# Patient Record
Sex: MALE | Race: WHITE | NOT HISPANIC OR LATINO | Employment: FULL TIME | URBAN - METROPOLITAN AREA
[De-identification: names, ages, dates, MRNs, and addresses within clinical notes are randomized per-mention and may not be internally consistent; named-entity substitution may affect disease eponyms.]

---

## 2018-01-11 ENCOUNTER — OFFICE VISIT (OUTPATIENT)
Dept: ORTHOPEDICS | Facility: CLINIC | Age: 58
End: 2018-01-11
Payer: COMMERCIAL

## 2018-01-11 VITALS — HEIGHT: 72 IN | WEIGHT: 227 LBS | BODY MASS INDEX: 30.75 KG/M2

## 2018-01-11 DIAGNOSIS — S82.55XA CLOSED NONDISPLACED FRACTURE OF MEDIAL MALLEOLUS OF LEFT TIBIA, INITIAL ENCOUNTER: Primary | ICD-10-CM

## 2018-01-11 PROCEDURE — 27760 CLTX MEDIAL ANKLE FX: CPT | Mod: LT | Performed by: ORTHOPAEDIC SURGERY

## 2018-01-11 ASSESSMENT — PAIN SCALES - GENERAL: PAINLEVEL: MILD PAIN (3)

## 2018-01-11 NOTE — MR AVS SNAPSHOT
"              After Visit Summary   1/11/2018    Jf Díaz    MRN: [id number]           Patient Information     Date Of Birth          1960        Visit Information        Provider Department      1/11/2018 8:30 AM Mannie Nguyễn DO [facility]        Today's Diagnoses     Closed nondisplaced fracture of medial malleolus of left tibia, initial encounter    -  1       Follow-ups after your visit        Your next 10 appointments already scheduled     Jan 17, 2018  8:00 AM CST   Return Visit with Mannie Nguyễn DO   [facility] ([facility]    [address] [place] [address] [id number] [phone]              Who to contact     If you have questions or need follow up information about today's clinic visit or your schedule please contact [facility] directly at [phone].  Normal or non-critical lab and imaging results will be communicated to you by MyChart, letter or phone within 4 business days after the clinic has received the results. If you do not hear from us within 7 days, please contact the clinic through MyChart or phone. If you have a critical or abnormal lab result, we will notify you by phone as soon as possible.  Submit refill requests through WatrHub or call your pharmacy and they will forward the refill request to us. Please allow 3 business days for your refill to be completed.          Additional Information About Your Visit        MyChart Information     WatrHub lets you send messages to your doctor, view your test results, renew your prescriptions, schedule appointments and more. To sign up, go to [email or web address] . Click on \"Log in\" on the left side of the screen, which will take you to the Welcome page. Then click on \"Sign up Now\" on the right side of the page.     You will be asked to enter the access code listed below, as well as some personal information. Please follow the directions to " create your username and password.     Your access code is: [email or web address]  Expires: 2018  8:58 AM     Your access code will  in 90 days. If you need help or a new code, please call your [place] clinic or [phone].        Care EveryWhere ID     This is your Care EveryWhere ID. This could be used by other organizations to access your [place] medical records  [id number]        Your Vitals Were     Height BMI (Body Mass Index)                1.829 m (6') 30.79 kg/m2           Blood Pressure from Last 3 Encounters:   No data found for BP    Weight from Last 3 Encounters:   18 103 kg (227 lb)              Today, you had the following     No orders found for display       Primary Care Provider    Radiology [facility] Provider       No address on file        Equal Access to Services     MERVIN VALLE : Celine Tay, wayogesh luqadaha, qaybta kaalmada reid, ivis martinez . So [facility] [phone].    ATENCIÓN: Si habla español, tiene a brown disposición servicios gratuitos de asistencia lingüística. Llame al [phone].    We comply with applicable federal civil rights laws and [place] laws. We do not discriminate on the basis of race, color, national origin, age, disability, sex, sexual orientation, or gender identity.            Thank you!     Thank you for choosing [facility]  for your care. Our goal is always to provide you with excellent care. Hearing back from our patients is one way we can continue to improve our services. Please take a few minutes to complete the written survey that you may receive in the mail after your visit with us. Thank you!             Your Updated Medication List - Protect others around you: Learn how to safely use, store and throw away your medicines at www.disposemymeds.org.      Notice  As of 2018 12:35 PM    You have not been prescribed any medications.

## 2018-01-11 NOTE — LETTER
1/11/2018         RE: Jf Díaz  [address]  [facility] [place] [address]        Dear Colleague,    Thank you for referring your patient, Jf Díaz, to the [facility]. Please see a copy of my visit note below.    ORTHOPEDIC CONSULT      Chief Complaint: Jf Díaz is a 57 year old male who is being seen for Chief Complaint   Patient presents with     Consult     left ankle pain        History of Present Illness:   On January 7, 2018 had a twisting event injuring his left ankle.  Subsequently evaluated and diagnosed with a medial malleolus fracture.  He is placed in a splint and given crutches.  He has been taking ibuprofen for pain.  No pre-existing injury.    History of a contralateral right tibia fracture requiring surgery.        Patient's past medical, surgical, social and family histories reviewed.     Past medical history: Hernia, appendicitis, right tibia fracture  Past surgeries: Right tibia open reduction internal fixation  Medications:    No current outpatient prescriptions on file prior to visit.  No current facility-administered medications on file prior to visit.     Allergies   Allergen Reactions     Codeine Hives       Social History     Occupational History     Not on file.     Social History Main Topics     Smoking status: Never Smoker     Smokeless tobacco: Not on file     Alcohol use Not on file     Drug use: Not on file     Sexual activity: Not on file       Family history includes lung cancer, bone cancer  REVIEW OF SYSTEMS  10 point review systems performed otherwise negative as noted as per history of present illness.    Physical Exam:  Vitals: Ht 6' (1.829 m)  Wt 227 lb (103 kg)  BMI 30.79 kg/m2  BMI= Body mass index is 30.79 kg/(m^2).  Constitutional: healthy, alert and no acute distress   Psychiatric: mentation appears normal and affect normal/bright  NEURO: no focal deficits  RESP: Normal with easy respirations and no use of accessory muscles to  breathe, no audible wheezing or retractions  CV: LLE:  midcalf and down-  non-pitting edema         Regular rate and rhythm by palpation  SKIN: No erythema, rashes, excoriation, or breakdown. No evidence of infection.   JOINT/EXTREMITIES:left ankle: Motion not tested.  Tenderness along the medial malleolus.  Associated with some swelling and ecchymosis.  Laterally no pain along the lateral malleolus or the event is complex.  There is no Lisfranc's or foot pain.  He has got good capillary refill.  Foot is well perfused.  There is no proximal tib-fib pain.  Some tenderness along the calf area.  (He reports this was associated with injury)     GAIT: not tested     Diagnostic Modalities:  left ankle X-ray: Transverse very minimally displaced medial malleolar fragment.  The mortise is well-maintained.  There is no subluxation.  Independent visualization of the images was performed.      Impression: left very minimally displaced medial malleolar fragment with an intact mortise    Plan:  All of the above pertinent physical exam and imaging modalities findings was reviewed with Jf.    Options reviewed.  Given his minimal displacement I recommended trialing conservative care.  Provided him a fracture boot today.  Recommended nonweightbearing with his crutches.  Edema control measures reviewed.  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  No definitive causes were found at that time he reports.  I recommended going on aspirin 325 twice daily until able to gain some more mobility.  Plan to see back next week for repeat radiograph to make sure no change in alignment.      Return to clinic 1, week(s), or sooner as needed for changes.  Re-x-ray on return: Yes.  Nonweightbearing 3 view left ankle    Antolin Nguyễn D.O.    Again, thank you for allowing me to participate in the care of your patient.        Sincerely,        Mannie Nguyễn, DO

## 2018-01-11 NOTE — NURSING NOTE
Chief Complaint   Patient presents with     Consult     left ankle pain        Initial Ht 1.829 m (6')  Wt 103 kg (227 lb)  BMI 30.79 kg/m2 Estimated body mass index is 30.79 kg/(m^2) as calculated from the following:    Height as of this encounter: 1.829 m (6').    Weight as of this encounter: 103 kg (227 lb).  Medication Reconciliation: complete    BP completed using cuff size: NA (Not Taken)    Anyi Garsia MA

## 2018-01-11 NOTE — PROGRESS NOTES
ORTHOPEDIC CONSULT      Chief Complaint: Jf Díaz is a 57 year old male who is being seen for Chief Complaint   Patient presents with     Consult     left ankle pain        History of Present Illness:   On January 7, 2018 had a twisting event injuring his left ankle.  Subsequently evaluated and diagnosed with a medial malleolus fracture.  He is placed in a splint and given crutches.  He has been taking ibuprofen for pain.  No pre-existing injury.    History of a contralateral right tibia fracture requiring surgery.        Patient's past medical, surgical, social and family histories reviewed.     Past medical history: Hernia, appendicitis, right tibia fracture  Past surgeries: Right tibia open reduction internal fixation  Medications:    No current outpatient prescriptions on file prior to visit.  No current facility-administered medications on file prior to visit.     Allergies   Allergen Reactions     Codeine Hives       Social History     Occupational History     Not on file.     Social History Main Topics     Smoking status: Never Smoker     Smokeless tobacco: Not on file     Alcohol use Not on file     Drug use: Not on file     Sexual activity: Not on file       Family history includes lung cancer, bone cancer  REVIEW OF SYSTEMS  10 point review systems performed otherwise negative as noted as per history of present illness.    Physical Exam:  Vitals: Ht 6' (1.829 m)  Wt 227 lb (103 kg)  BMI 30.79 kg/m2  BMI= Body mass index is 30.79 kg/(m^2).  Constitutional: healthy, alert and no acute distress   Psychiatric: mentation appears normal and affect normal/bright  NEURO: no focal deficits  RESP: Normal with easy respirations and no use of accessory muscles to breathe, no audible wheezing or retractions  CV: LLE:  midcalf and down-  non-pitting edema         Regular rate and rhythm by palpation  SKIN: No erythema, rashes, excoriation, or breakdown. No evidence of infection.   JOINT/EXTREMITIES:left  ankle: Motion not tested.  Tenderness along the medial malleolus.  Associated with some swelling and ecchymosis.  Laterally no pain along the lateral malleolus or the event is complex.  There is no Lisfranc's or foot pain.  He has got good capillary refill.  Foot is well perfused.  There is no proximal tib-fib pain.  Some tenderness along the calf area.  (He reports this was associated with injury)     GAIT: not tested     Diagnostic Modalities:  left ankle X-ray: Transverse very minimally displaced medial malleolar fragment.  The mortise is well-maintained.  There is no subluxation.  Independent visualization of the images was performed.      Impression: left very minimally displaced medial malleolar fragment with an intact mortise    Plan:  All of the above pertinent physical exam and imaging modalities findings was reviewed with Jf.    Options reviewed.  Given his minimal displacement I recommended trialing conservative care.  Provided him a fracture boot today.  Recommended nonweightbearing with his crutches.  Edema control measures reviewed.  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  No definitive causes were found at that time he reports.  I recommended going on aspirin 325 twice daily until able to gain some more mobility.  Plan to see back next week for repeat radiograph to make sure no change in alignment.      Return to clinic 1, week(s), or sooner as needed for changes.  Re-x-ray on return: Yes.  Nonweightbearing 3 view left ankle    Antolin Nguyễn D.O.

## 2018-01-17 ENCOUNTER — RADIANT APPOINTMENT (OUTPATIENT)
Dept: GENERAL RADIOLOGY | Facility: CLINIC | Age: 58
End: 2018-01-17
Attending: ORTHOPAEDIC SURGERY
Payer: COMMERCIAL

## 2018-01-17 ENCOUNTER — OFFICE VISIT (OUTPATIENT)
Dept: ORTHOPEDICS | Facility: CLINIC | Age: 58
End: 2018-01-17
Payer: COMMERCIAL

## 2018-01-17 VITALS — WEIGHT: 227 LBS | BODY MASS INDEX: 30.75 KG/M2 | HEIGHT: 72 IN | TEMPERATURE: 97.1 F

## 2018-01-17 DIAGNOSIS — S82.55XA CLOSED NONDISPLACED FRACTURE OF MEDIAL MALLEOLUS OF LEFT TIBIA, INITIAL ENCOUNTER: ICD-10-CM

## 2018-01-17 DIAGNOSIS — S82.55XD CLOSED NONDISPLACED FRACTURE OF MEDIAL MALLEOLUS OF LEFT TIBIA WITH ROUTINE HEALING, SUBSEQUENT ENCOUNTER: Primary | ICD-10-CM

## 2018-01-17 PROCEDURE — 99207 ZZC FRACTURE CARE IN GLOBAL PERIOD: CPT | Performed by: ORTHOPAEDIC SURGERY

## 2018-01-17 PROCEDURE — 73610 X-RAY EXAM OF ANKLE: CPT | Mod: TC

## 2018-01-17 RX ORDER — ASPIRIN 325 MG
325 TABLET ORAL 2 TIMES DAILY
Qty: 90 TABLET | Refills: 3 | COMMUNITY
Start: 2018-01-17 | End: 2018-12-24

## 2018-01-17 ASSESSMENT — PAIN SCALES - GENERAL: PAINLEVEL: MILD PAIN (3)

## 2018-01-17 NOTE — PROGRESS NOTES
Office Visit-Follow up    Chief Complaint: fJ Díaz is a 57 year old male who is being seen for   Chief Complaint   Patient presents with     RECHECK     left very minimally displaced medial malleolar fragment with an intact mortise- DOI: 1/7/2018       History of Present Illness:   Today's visit:  Returns to clinic.  States pain improving.  He also noted that after the injury he had an immediate pain to posterior calf near ankle, this pain is improving.  Swelling improving to ankle and calf.  Pain tolerable.  Back to work.  States compliant with fracture boot, crutches, and non-weight bearing.    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  Has not started aspirin yet.  Stated he will be.  Did not want an Rx for this.   1/11/18 visit:  On January 7, 2018 had a twisting event injuring his left ankle.  Subsequently evaluated and diagnosed with a medial malleolus fracture.  He is placed in a splint and given crutches.  He has been taking ibuprofen for pain.  No pre-existing injury.   History of a contralateral right tibia fracture requiring surgery.      Physical Exam:  Vitals: Temp 97.1  F (36.2  C) (Temporal)  Ht 1.829 m (6')  Wt 103 kg (227 lb)  BMI 30.79 kg/m2  BMI= Body mass index is 30.79 kg/(m^2).  Constitutional: healthy, alert and no acute distress   Psychiatric: mentation appears normal and affect normal/bright  NEURO: no focal deficits  RESP: Normal with easy respirations and no use of accessory muscles to breathe, no audible wheezing or retractions  CV: LLE:  midcalf and down-  non-pitting edema         Regular rate and rhythm by palpation  SKIN: No erythema, rashes, excoriation, or breakdown. No evidence of infection.   JOINT/EXTREMITIES:left ankle: Motion not tested.  Tenderness along the medial malleolus.  Associated with mild swelling and ecchymosis.  Laterally no pain along the lateral malleolus. Calf soft.   There is no Lisfranc's or foot pain.  He has got good capillary refill.  distal pulse 2+. There is no proximal tib-fib pain.  Posterior/distal calf mildly tender, no focal swelling.   (He reports this was associated with injury)    Diagnostic Modalities:  left ankle X-ray reviewed and compared against previous: Transverse very minimally displaced medial malleolar fragment again seen  The mortise is still well-maintained.  There is no subluxation. No change to alignment  Independent visualization of the images was performed.      Impression: left very minimally displaced medial malleolar fragment with an intact mortise    Plan:  All of the above pertinent physical exam and imaging modalities findings was reviewed with Jf.    Imaging unchanged, clinically improving.  Discussed with patient about starting aspirin, and patient states he will.  I discussed his radiographs at length.  It is slightly displaced.  Certainly could argue for screw fixation.  However could also discuss continuing conservative care.  I discussed the risk benefits/pros and cons.  With that being reviewed he is opted to proceed with nonsurgical treatment at this point.    Continue non-weight bearing with fracture boot and crutches.        Return to clinic 2, week(s), or sooner as needed for changes.  Re-x-ray on return: Yes. Nonweightbearing 3 view.    Scribed by:  David Rosales, APRN, CNP, DNP  8:03 AM  1/17/2018    I attest I have seen and evaluated the patient.  I agree with above impression and plan.  Antolin Nguyễn D.O.

## 2018-01-17 NOTE — NURSING NOTE
Chief Complaint   Patient presents with     RECHECK     left very minimally displaced medial malleolar fragment with an intact mortise- DOI: 1/7/2018       Initial Temp 97.1  F (36.2  C) (Temporal)  Ht 1.829 m (6')  Wt 103 kg (227 lb)  BMI 30.79 kg/m2 Estimated body mass index is 30.79 kg/(m^2) as calculated from the following:    Height as of this encounter: 1.829 m (6').    Weight as of this encounter: 103 kg (227 lb).  Medication Reconciliation: miryam Leo/JESSICA

## 2018-01-17 NOTE — MR AVS SNAPSHOT
"              After Visit Summary   1/17/2018    Jf Díaz    MRN: [id number]           Patient Information     Date Of Birth          1960        Visit Information        Provider Department      1/17/2018 8:00 AM Mannie Nguyễn DO [facility]        Today's Diagnoses     Closed nondisplaced fracture of medial malleolus of left tibia with routine healing, subsequent encounter    -  1       Follow-ups after your visit        Your next 10 appointments already scheduled     Jan 17, 2018  8:00 AM CST   Return Visit with Mannie Nguyễn DO   [facility]    [address] [place] [address] [id number] [phone]              Who to contact     If you have questions or need follow up information about today's clinic visit or your schedule please contact [facility] directly at [phone].  Normal or non-critical lab and imaging results will be communicated to you by MyChart, letter or phone within 4 business days after the clinic has received the results. If you do not hear from us within 7 days, please contact the clinic through SwitchForcehart or phone. If you have a critical or abnormal lab result, we will notify you by phone as soon as possible.  Submit refill requests through WebVet or call your pharmacy and they will forward the refill request to us. Please allow 3 business days for your refill to be completed.          Additional Information About Your Visit        MyChart Information     WebVet lets you send messages to your doctor, view your test results, renew your prescriptions, schedule appointments and more. To sign up, go to [email or web address] . Click on \"Log in\" on the left side of the screen, which will take you to the Welcome page. Then click on \"Sign up Now\" on the right side of the page.     You will be asked to enter the access code listed below, as well as some personal information. Please " follow the directions to create your username and password.     Your access code is: [email or web address]  Expires: 2018  8:58 AM     Your access code will  in 90 days. If you need help or a new code, please call your [place] clinic or [phone].        Care EveryWhere ID     This is your Care EveryWhere ID. This could be used by other organizations to access your [place] medical records  [id number]        Your Vitals Were     Temperature Height BMI (Body Mass Index)             97.1  F (36.2  C) (Temporal) 1.829 m (6') 30.79 kg/m2          Blood Pressure from Last 3 Encounters:   No data found for BP    Weight from Last 3 Encounters:   18 103 kg (227 lb)   18 103 kg (227 lb)               Primary Care Provider    Radiology Sh Non-[facility]  Provider       No address on file        Equal Access to Services     MERVIN VALLE : Hadii alexandru robert Sotobias, waaxda luqadaha, qaybta kaalmada adekeyuryaarielle, ivis martinez . So [facility] [phone].    ATENCIÓN: Si habla español, tiene a brown disposición servicios gratuitos de asistencia lingüística. Llame al [phone].    We comply with applicable federal civil rights laws and [place] laws. We do not discriminate on the basis of race, color, national origin, age, disability, sex, sexual orientation, or gender identity.            Thank you!     Thank you for choosing [facility]  for your care. Our goal is always to provide you with excellent care. Hearing back from our patients is one way we can continue to improve our services. Please take a few minutes to complete the written survey that you may receive in the mail after your visit with us. Thank you!             Your Updated Medication List - Protect others around you: Learn how to safely use, store and throw away your medicines at www.disposemymeds.org.      Notice  As of 2018  7:51 AM    You have not been prescribed any medications.

## 2018-01-17 NOTE — LETTER
1/17/2018         RE: Jf Díaz  [address]  [place] [address]        Dear Colleague,    Thank you for referring your patient, Jf Díaz, to the [facility]. Please see a copy of my visit note below.    Office Visit-Follow up    Chief Complaint: Jf Díaz is a 57 year old male who is being seen for   Chief Complaint   Patient presents with     RECHECK     left very minimally displaced medial malleolar fragment with an intact mortise- DOI: 1/7/2018       History of Present Illness:   Today's visit:  Returns to clinic.  States pain improving.  He also noted that after the injury he had an immediate pain to posterior calf near ankle, this pain is improving.  Swelling improving to ankle and calf.  Pain tolerable.  Back to work.  States compliant with fracture boot, crutches, and non-weight bearing.    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  Has not started aspirin yet.  Stated he will be.  Did not want an Rx for this.   1/11/18 visit:  On January 7, 2018 had a twisting event injuring his left ankle.  Subsequently evaluated and diagnosed with a medial malleolus fracture.  He is placed in a splint and given crutches.  He has been taking ibuprofen for pain.  No pre-existing injury.   History of a contralateral right tibia fracture requiring surgery.      Physical Exam:  Vitals: Temp 97.1  F (36.2  C) (Temporal)  Ht 1.829 m (6')  Wt 103 kg (227 lb)  BMI 30.79 kg/m2  BMI= Body mass index is 30.79 kg/(m^2).  Constitutional: healthy, alert and no acute distress   Psychiatric: mentation appears normal and affect normal/bright  NEURO: no focal deficits  RESP: Normal with easy respirations and no use of accessory muscles to breathe, no audible wheezing or retractions  CV: LLE:  midcalf and down-  non-pitting edema         Regular rate and rhythm by palpation  SKIN: No erythema, rashes,  excoriation, or breakdown. No evidence of infection.   JOINT/EXTREMITIES:left ankle: Motion not tested.  Tenderness along the medial malleolus.  Associated with mild swelling and ecchymosis.  Laterally no pain along the lateral malleolus. Calf soft.  There is no Lisfranc's or foot pain.  He has got good capillary refill.   distal pulse 2+. There is no proximal tib-fib pain.   Posterior/distal calf mildly tender, no focal swelling.   (He reports this was associated with injury)    Diagnostic Modalities:  left ankle X-ray reviewed and compared against previous: Transverse very minimally displaced medial malleolar fragment again seen  The mortise is still well-maintained.  There is no subluxation. No change to alignment  Independent visualization of the images was performed.      Impression: left very minimally displaced medial malleolar fragment with an intact mortise    Plan:  All of the above pertinent physical exam and imaging modalities findings was reviewed with Jf.    Imaging unchanged, clinically improving.  Discussed with patient about starting aspirin, and patient states he will.  I discussed his radiographs at length.  It is slightly displaced.  Certainly could argue for screw fixation.  However could also discuss continuing conservative care.  I discussed the risk benefits/pros and cons.  With that being reviewed he is opted to proceed with nonsurgical treatment at this point.    Continue non-weight bearing with fracture boot and crutches.        Return to clinic 2, week(s), or sooner as needed for changes.  Re-x-ray on return: Yes. Nonweightbearing 3 view.    Scribed by:  David Rosales, APRN, CNP, DNP  8:03 AM  1/17/2018    I attest I have seen and evaluated the patient.  I agree with above impression and plan.  Antolin Nguyễn D.O.          Again, thank you for allowing me to participate in the care of your patient.        Sincerely,        Mannie Nguyễn, DO

## 2018-02-07 ENCOUNTER — RADIANT APPOINTMENT (OUTPATIENT)
Dept: GENERAL RADIOLOGY | Facility: CLINIC | Age: 58
End: 2018-02-07
Attending: ORTHOPAEDIC SURGERY
Payer: COMMERCIAL

## 2018-02-07 ENCOUNTER — OFFICE VISIT (OUTPATIENT)
Dept: ORTHOPEDICS | Facility: CLINIC | Age: 58
End: 2018-02-07
Payer: COMMERCIAL

## 2018-02-07 VITALS — WEIGHT: 227 LBS | HEIGHT: 72 IN | BODY MASS INDEX: 30.75 KG/M2 | TEMPERATURE: 97.3 F

## 2018-02-07 DIAGNOSIS — S82.55XD CLOSED NONDISPLACED FRACTURE OF MEDIAL MALLEOLUS OF LEFT TIBIA WITH ROUTINE HEALING, SUBSEQUENT ENCOUNTER: Primary | ICD-10-CM

## 2018-02-07 DIAGNOSIS — S82.55XA CLOSED NONDISPLACED FRACTURE OF MEDIAL MALLEOLUS OF LEFT TIBIA, INITIAL ENCOUNTER: ICD-10-CM

## 2018-02-07 PROCEDURE — 99207 ZZC FRACTURE CARE IN GLOBAL PERIOD: CPT | Performed by: ORTHOPAEDIC SURGERY

## 2018-02-07 PROCEDURE — 73610 X-RAY EXAM OF ANKLE: CPT | Mod: TC

## 2018-02-07 ASSESSMENT — PAIN SCALES - GENERAL: PAINLEVEL: NO PAIN (0)

## 2018-02-07 NOTE — MR AVS SNAPSHOT
"              After Visit Summary   2018    Jf Díaz    MRN: [id number]           Patient Information     Date Of Birth          1960        Visit Information        Provider Department      2018 8:00 AM Mannie Nguyễn,  [facility]        Today's Diagnoses     Closed nondisplaced fracture of medial malleolus of left tibia with routine healing, subsequent encounter    -  1       Follow-ups after your visit        Who to contact     If you have questions or need follow up information about today's clinic visit or your schedule please contact [facility] directly at [phone].  Normal or non-critical lab and imaging results will be communicated to you by [employer], letter or phone within 4 business days after the clinic has received the results. If you do not hear from us within 7 days, please contact the clinic through [employer] or phone. If you have a critical or abnormal lab result, we will notify you by phone as soon as possible.  Submit refill requests through [employer] or call your pharmacy and they will forward the refill request to us. Please allow 3 business days for your refill to be completed.          Additional Information About Your Visit        MyChart Information     [employer] lets you send messages to your doctor, view your test results, renew your prescriptions, schedule appointments and more. To sign up, go to [email or web address] . Click on \"Log in\" on the left side of the screen, which will take you to the Welcome page. Then click on \"Sign up Now\" on the right side of the page.     You will be asked to enter the access code listed below, as well as some personal information. Please follow the directions to create your username and password.     Your access code is: [email or web address]  Expires: 2018  8:58 AM     Your access code will  in 90 days. If you need help or a new code, please call your [facility] or [phone].      "   Care EveryWhere ID     This is your Care EveryWhere ID. This could be used by other organizations to access your [place] medical records  [id number]        Your Vitals Were     Temperature Height BMI (Body Mass Index)             97.3  F (36.3  C) (Temporal) 1.829 m (6') 30.79 kg/m2          Blood Pressure from Last 3 Encounters:   No data found for BP    Weight from Last 3 Encounters:   02/07/18 103 kg (227 lb)   01/17/18 103 kg (227 lb)   01/11/18 103 kg (227 lb)               Primary Care Provider    Radiology Sh Non-Fv  Provider       No address on file        Equal Access to Services     MERVIN VALLE : Hadii alexandru robert Sotobias, wajose miguelda luqadaha, qaybta kaalmada adekeyuryaarielle, ivis martinez . So [facility] [phone].    ATENCIÓN: Si habla español, tiene a brown disposición servicios gratuitos de asistencia lingüística. Llame al [phone].    We comply with applicable federal civil rights laws and [place] laws. We do not discriminate on the basis of race, color, national origin, age, disability, sex, sexual orientation, or gender identity.            Thank you!     Thank you for choosing [facility]  for your care. Our goal is always to provide you with excellent care. Hearing back from our patients is one way we can continue to improve our services. Please take a few minutes to complete the written survey that you may receive in the mail after your visit with us. Thank you!             Your Updated Medication List - Protect others around you: Learn how to safely use, store and throw away your medicines at www.disposemymeds.org.          This list is accurate as of 2/7/18  8:21 AM.  Always use your most recent med list.                   Brand Name Dispense Instructions for use Diagnosis    aspirin 325 MG tablet     90 tablet    Take 1 tablet (325 mg) by mouth 2 times daily Take 1 tablet twice daily until mobility returns to help prevent blood clots    Closed  nondisplaced fracture of medial malleolus of left tibia with routine healing, subsequent encounter

## 2018-02-07 NOTE — NURSING NOTE
Chief Complaint   Patient presents with     RECHECK     left very minimally displaced medial malleolar fragment with an intact mortise- DOI: 1/7/2018       Initial Temp 97.3  F (36.3  C) (Temporal)  Ht 1.829 m (6')  Wt 103 kg (227 lb)  BMI 30.79 kg/m2 Estimated body mass index is 30.79 kg/(m^2) as calculated from the following:    Height as of this encounter: 1.829 m (6').    Weight as of this encounter: 103 kg (227 lb).  Medication Reconciliation: miryam Leo/JESSICA

## 2018-02-07 NOTE — PROGRESS NOTES
Office Visit-Follow up    Chief Complaint: Jf Díaz is a 58 year old male who is being seen for   Chief Complaint   Patient presents with     RECHECK     left very minimally displaced medial malleolar fragment with an intact mortise- DOI: 1/7/2018       History of Present Illness:   Today's visit:  Returns for follow-up.  Very minimal pain.  He has been compliant with instructions.  January 17 2018 visit:  Returns to clinic.  States pain improving.  He also noted that after the injury he had an immediate pain to posterior calf near ankle, this pain is improving.  Swelling improving to ankle and calf.  Pain tolerable.  Back to work.  States compliant with fracture boot, crutches, and non-weight bearing.    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  Has not started aspirin yet.  Stated he will be.  Did not want an Rx for this.   1/11/18 visit:  On January 7, 2018 had a twisting event injuring his left ankle.  Subsequently evaluated and diagnosed with a medial malleolus fracture.  He is placed in a splint and given crutches.  He has been taking ibuprofen for pain.  No pre-existing injury.   History of a contralateral right tibia fracture requiring surgery.            REVIEW OF SYSTEMS  General: negative for, night sweats, dizziness, fatigue  Resp: No shortness of breath and no cough  CV: negative for chest pain, syncope or near-syncope  GI: negative for nausea, vomiting and diarrhea  : negative for dysuria and hematuria  Musculoskeletal: as above  Neurologic: negative for syncope   Hematologic: negative for bleeding disorder    Physical Exam:  Vitals: Temp 97.3  F (36.3  C) (Temporal)  Ht 6' (1.829 m)  Wt 227 lb (103 kg)  BMI 30.79 kg/m2  BMI= Body mass index is 30.79 kg/(m^2).  Constitutional: healthy, alert and no acute distress   Psychiatric: mentation appears normal and affect normal/bright  NEURO: no focal deficits  RESP:  Normal with easy respirations and no use of accessory muscles to breathe, no audible wheezing or retractions  CV: LLE:  no edema         Regular rate and rhythm by palpation  SKIN: No erythema, rashes, excoriation, or breakdown. No evidence of infection.   JOINT/EXTREMITIES:left equal: No focal areas of tenderness.  Some stiffness with dorsiflexion plantarflexion.  No deformity  GAIT: not tested             Diagnostic Modalities:  left ankle X-ray: Very minimally displaced medial malleolar fracture with some bridging callus and decreased lucency.  Mortise is maintained with no subluxation.  Independent visualization of the images was performed.      Impression: left minimally displaced medial malleolar fracture with routine healing    Plan:  All of the above pertinent physical exam and imaging modalities findings was reviewed with Jf.    I reviewed his radiographs.  Recommend progressive weightbearing in his fracture boot over the next 2 weeks.  As he is full weightbearing he can get rid of the crutches.  He should then work on transitioning to regular shoe wear.  He may need to return back to the crutches doing this.  Over-the-counter medications for discomfort.    Remove the boot at night and when at rest.      Return to clinic 4, week(s), or sooner as needed for changes.  Re-x-ray on return: Yes.  Left ankle 3 view weightbearing    Antolin Nguyễn D.O.

## 2018-02-07 NOTE — LETTER
2/7/2018         RE: Jf Díaz  [address]  [place] [address]        Dear Colleague,    Thank you for referring your patient, Jf Díaz, to the [facility]. Please see a copy of my visit note below.    Office Visit-Follow up    Chief Complaint: Jf Díaz is a 58 year old male who is being seen for   Chief Complaint   Patient presents with     RECHECK     left very minimally displaced medial malleolar fragment with an intact mortise- DOI: 1/7/2018       History of Present Illness:   Today's visit:  Returns for follow-up.  Very minimal pain.  He has been compliant with instructions.  January 17 2018 visit:  Returns to clinic.  States pain improving.  He also noted that after the injury he had an immediate pain to posterior calf near ankle, this pain is improving.  Swelling improving to ankle and calf.  Pain tolerable.  Back to work.  States compliant with fracture boot, crutches, and non-weight bearing.    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  Has not started aspirin yet.  Stated he will be.  Did not want an Rx for this.   1/11/18 visit:  On January 7, 2018 had a twisting event injuring his left ankle.  Subsequently evaluated and diagnosed with a medial malleolus fracture.  He is placed in a splint and given crutches.  He has been taking ibuprofen for pain.  No pre-existing injury.   History of a contralateral right tibia fracture requiring surgery.            REVIEW OF SYSTEMS  General: negative for, night sweats, dizziness, fatigue  Resp: No shortness of breath and no cough  CV: negative for chest pain, syncope or near-syncope  GI: negative for nausea, vomiting and diarrhea  : negative for dysuria and hematuria  Musculoskeletal: as above  Neurologic: negative for syncope   Hematologic: negative for bleeding disorder    Physical Exam:  Vitals: Temp 97.3  F (36.3  C) (Temporal)   Ht 6' (1.829 m)  Wt 227 lb (103 kg)  BMI 30.79 kg/m2  BMI= Body mass index is 30.79 kg/(m^2).  Constitutional: healthy, alert and no acute distress   Psychiatric: mentation appears normal and affect normal/bright  NEURO: no focal deficits  RESP: Normal with easy respirations and no use of accessory muscles to breathe, no audible wheezing or retractions  CV: LLE:  no edema         Regular rate and rhythm by palpation  SKIN: No erythema, rashes, excoriation, or breakdown. No evidence of infection.   JOINT/EXTREMITIES:left equal: No focal areas of tenderness.  Some stiffness with dorsiflexion plantarflexion.  No deformity  GAIT: not tested             Diagnostic Modalities:  left ankle X-ray: Very minimally displaced medial malleolar fracture with some bridging callus and decreased lucency.  Mortise is maintained with no subluxation.  Independent visualization of the images was performed.      Impression: left minimally displaced medial malleolar fracture with routine healing    Plan:  All of the above pertinent physical exam and imaging modalities findings was reviewed with Jf.    I reviewed his radiographs.  Recommend progressive weightbearing in his fracture boot over the next 2 weeks.  As he is full weightbearing he can get rid of the crutches.  He should then work on transitioning to regular shoe wear.  He may need to return back to the crutches doing this.  Over-the-counter medications for discomfort.    Remove the boot at night and when at rest.      Return to clinic 4, week(s), or sooner as needed for changes.  Re-x-ray on return: Yes.  Left ankle 3 view weightbearing    Antolin Nguyễn D.O.          Again, thank you for allowing me to participate in the care of your patient.        Sincerely,        Mannie Nguyễn, DO

## 2018-02-12 ENCOUNTER — RADIANT APPOINTMENT (OUTPATIENT)
Dept: GENERAL RADIOLOGY | Facility: OTHER | Age: 58
End: 2018-02-12
Attending: ORTHOPAEDIC SURGERY
Payer: COMMERCIAL

## 2018-02-12 ENCOUNTER — OFFICE VISIT (OUTPATIENT)
Dept: ORTHOPEDICS | Facility: OTHER | Age: 58
End: 2018-02-12
Payer: COMMERCIAL

## 2018-02-12 VITALS — TEMPERATURE: 98.1 F | HEIGHT: 72 IN | WEIGHT: 227 LBS | BODY MASS INDEX: 30.75 KG/M2

## 2018-02-12 DIAGNOSIS — S82.55XD CLOSED NONDISPLACED FRACTURE OF MEDIAL MALLEOLUS OF LEFT TIBIA WITH ROUTINE HEALING, SUBSEQUENT ENCOUNTER: Primary | ICD-10-CM

## 2018-02-12 DIAGNOSIS — S86.812A STRAIN OF CALF MUSCLE, LEFT, INITIAL ENCOUNTER: ICD-10-CM

## 2018-02-12 DIAGNOSIS — M25.572 PAIN IN JOINT INVOLVING ANKLE AND FOOT, LEFT: ICD-10-CM

## 2018-02-12 PROCEDURE — 99207 ZZC FRACTURE CARE IN GLOBAL PERIOD: CPT | Performed by: ORTHOPAEDIC SURGERY

## 2018-02-12 PROCEDURE — 73610 X-RAY EXAM OF ANKLE: CPT | Mod: LT

## 2018-02-12 ASSESSMENT — PAIN SCALES - GENERAL: PAINLEVEL: NO PAIN (0)

## 2018-02-12 NOTE — MR AVS SNAPSHOT
After Visit Summary   2/12/2018    Jf Díaz    MRN: [id number]           Patient Information     Date Of Birth          1960        Visit Information        Provider Department      2/12/2018 9:20 AM Mannie Nguyễn DO [facility]        Today's Diagnoses     Pain in joint involving ankle and foot, left    -  1    Closed nondisplaced fracture of medial malleolus of left tibia, initial encounter           Follow-ups after your visit        Your next 10 appointments already scheduled     Feb 12, 2018  9:20 AM CST   [facility] Mannie [facility]guy[facility]n [facility]    [address]  [address]  [facility] [address]   [phone]            Feb 12, 2018  9:30 AM CST   (Arrive by 9:15 AM)   XR ANKLE LEFT G/E 3 VIEWS with [facility]   [facility]    [address] [address]   [phone]           Please bring a list of your current medicines to your exam. (Include vitamins, minerals and over-thecounter medicines.) Leave your valuables at home.  Tell your doctor if there is a chance you may be pregnant.  You do not need to do anything special for this exam.              Who to contact     If you have questions or need follow up information about today's clinic visit or your schedule please contact [facility] directly at [phone].  Normal or non-critical lab and imaging results will be communicated to you by MyChart, letter or phone within 4 business days after the clinic has received the results. If you do not hear from us within 7 days, please contact the clinic through MyChart or phone. If you have a critical or abnormal lab result, we will notify you by phone as soon as possible.  Submit refill requests through [employer] or call your pharmacy and they will forward the refill request to us. Please allow 3 business days for your  "refill to be completed.          Additional Information About Your Visit        [email or web address] Information     [employer] lets you send messages to your doctor, view your test results, renew your prescriptions, schedule appointments and more. To sign up, go to [email or web address] . Click on \"Log in\" on the left side of the screen, which will take you to the Welcome page. Then click on \"Sign up Now\" on the right side of the page.     You will be asked to enter the access code listed below, as well as some personal information. Please follow the directions to create your username and password.     Your access code is: [email or web address]  Expires: 2018  8:58 AM     Your access code will  in 90 days. If you need help or a new code, please call your [place] clinic or [phone].        Care EveryWhere ID     This is your Care EveryWhere ID. This could be used by other organizations to access your [place] medical records  [id number]        Your Vitals Were     Temperature Height BMI (Body Mass Index)             98.1  F (36.7  C) (Temporal) 1.829 m (6') 30.79 kg/m2          Blood Pressure from Last 3 Encounters:   No data found for BP    Weight from Last 3 Encounters:   18 103 kg (227 lb)   18 103 kg (227 lb)   18 103 kg (227 lb)               Primary Care Provider    Radiology Sh Non-Fv  Provider       No address on file        Equal Access to Services     MERVIN VALLE : Hadii alexandru ku geovannao Sosgali, waaxda luqadaha, qaybta kaalmada adeegyada, ivis martinez . So [facility] [phone].    ATENCIÓN: Si habla español, tiene a brown disposición servicios gratuitos de asistencia lingüística. Llame al [phone].    We comply with applicable federal civil rights laws and [place] laws. We do not discriminate on the basis of race, color, national origin, age, disability, sex, sexual orientation, or gender identity.            Thank you!     Thank you for choosing [facility] " [facility]  for your care. Our goal is always to provide you with excellent care. Hearing back from our patients is one way we can continue to improve our services. Please take a few minutes to complete the written survey that you may receive in the mail after your visit with us. Thank you!             Your Updated Medication List - Protect others around you: Learn how to safely use, store and throw away your medicines at www.disposemymeds.org.          This list is accurate as of 2/12/18  8:36 AM.  Always use your most recent med list.                   Brand Name Dispense Instructions for use Diagnosis    aspirin 325 MG tablet     90 tablet    Take 1 tablet (325 mg) by mouth 2 times daily Take 1 tablet twice daily until mobility returns to help prevent blood clots    Closed nondisplaced fracture of medial malleolus of left tibia with routine healing, subsequent encounter

## 2018-02-12 NOTE — NURSING NOTE
Chief Complaint   Patient presents with     RECHECK     left very minimally displaced medial malleolar fragment with an intact mortise- DOI: 1/7/2018- reinjured-2/10/2018       Initial Temp 98.1  F (36.7  C) (Temporal)  Ht 1.829 m (6')  Wt 103 kg (227 lb)  BMI 30.79 kg/m2 Estimated body mass index is 30.79 kg/(m^2) as calculated from the following:    Height as of this encounter: 1.829 m (6').    Weight as of this encounter: 103 kg (227 lb).  Medication Reconciliation: complete   Ahmet/JESSICA

## 2018-02-12 NOTE — LETTER
2/12/2018         RE: Jf Díaz  [address]  [place] [address]        Dear Colleague,    Thank you for referring your patient, Jf Díaz, to the [facility]. Please see a copy of my visit note below.    Office Visit-Follow up    Chief Complaint: Jf Díaz is a 58 year old male who is being seen for   Chief Complaint   Patient presents with     RECHECK     left very minimally displaced medial malleolar fragment with an intact mortise- DOI: 1/7/2018- reinjured-2/10/2018       History of Present Illness:   Today's visit:  On February 10, 2018 he slipped on the ice.  He had his fracture boot on when this occurred.  In the process of getting up he felt pain in the posterior calf area.  It is since improved.  February 7, 2018 visit:  Returns for follow-up.  Very minimal pain.  He has been compliant with instructions.  January 17 2018 visit:  Returns to clinic.  States pain improving.  He also noted that after the injury he had an immediate pain to posterior calf near ankle, this pain is improving.  Swelling improving to ankle and calf.  Pain tolerable.  Back to work.  States compliant with fracture boot, crutches, and non-weight bearing.    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  Has not started aspirin yet.  Stated he will be.  Did not want an Rx for this.   1/11/18 visit:  On January 7, 2018 had a twisting event injuring his left ankle.  Subsequently evaluated and diagnosed with a medial malleolus fracture.  He is placed in a splint and given crutches.  He has been taking ibuprofen for pain.  No pre-existing injury.   History of a contralateral right tibia fracture requiring surgery.          REVIEW OF SYSTEMS  General: negative for, night sweats, dizziness, fatigue  Resp: No shortness of breath and no cough  CV: negative for chest pain, syncope or near-syncope  GI: negative for nausea,  vomiting and diarrhea  : negative for dysuria and hematuria  Musculoskeletal: as above  Neurologic: negative for syncope   Hematologic: negative for bleeding disorder    Physical Exam:  Vitals: Temp 98.1  F (36.7  C) (Temporal)  Ht 6' (1.829 m)  Wt 227 lb (103 kg)  BMI 30.79 kg/m2  BMI= Body mass index is 30.79 kg/(m^2).  Constitutional: healthy, alert and no acute distress   Psychiatric: mentation appears normal and affect normal/bright  NEURO: no focal deficits  RESP: Normal with easy respirations and no use of accessory muscles to breathe, no audible wheezing or retractions  CV: LLE:  no edema         Regular rate and rhythm by palpation  SKIN: No erythema, rashes, excoriation, or breakdown. No evidence of infection.   JOINT/EXTREMITIES:left lower leg: No proximal tib-fib pain.  Very minimal medial malleolar pain.  No foot or Lisfranc's pain.  He does have some tenderness along the muscular tendinous junction of the Achilles.  He can actively dorsiflex and plantarflex his ankle.  He can do it against resistance although does cause pain.  GAIT: not tested             Diagnostic Modalities:  left ankle X-ray: Mortise is maintained.  There is no subluxation or dislocation.  Medial malleolar fragment is unchanged.  Independent visualization of the images was performed.      Impression: left medial malleolus with routine healing  Left calf strain    Plan:  All of the above pertinent physical exam and imaging modalities findings was reviewed with Jf.    Options discussed.  The majority of his tenderness is more along the muscular tendinous junction of the Achilles.  He can actively dorsiflex and plantarflex his ankle.  He has significantly improved since his fall.  He was in his fracture boot with a fall.  I discussed options including rest for the next few days.  If he continues to have pain along the Achilles by the end of the week I recommend a left ankle MRI to rule out partial Achilles tear.  He is  agreeable to plan.  He will call us back.    Hold off on resuming activities.  Allow 1 week of  rest.  As he feels better he can start to progress his activity as per his last visit plan.      Return to clinic 1, week(s), or sooner as needed for changes.  Re-x-ray on return: No    Antolin Nguyễn D.O.          Again, thank you for allowing me to participate in the care of your patient.        Sincerely,        Mannie Nguyễn, DO

## 2018-02-12 NOTE — PROGRESS NOTES
Office Visit-Follow up    Chief Complaint: Jf Díaz is a 58 year old male who is being seen for   Chief Complaint   Patient presents with     RECHECK     left very minimally displaced medial malleolar fragment with an intact mortise- DOI: 1/7/2018- reinjured-2/10/2018       History of Present Illness:   Today's visit:  On February 10, 2018 he slipped on the ice.  He had his fracture boot on when this occurred.  In the process of getting up he felt pain in the posterior calf area.  It is since improved.  February 7, 2018 visit:  Returns for follow-up.  Very minimal pain.  He has been compliant with instructions.  January 17 2018 visit:  Returns to clinic.  States pain improving.  He also noted that after the injury he had an immediate pain to posterior calf near ankle, this pain is improving.  Swelling improving to ankle and calf.  Pain tolerable.  Back to work.  States compliant with fracture boot, crutches, and non-weight bearing.    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  Has not started aspirin yet.  Stated he will be.  Did not want an Rx for this.   1/11/18 visit:  On January 7, 2018 had a twisting event injuring his left ankle.  Subsequently evaluated and diagnosed with a medial malleolus fracture.  He is placed in a splint and given crutches.  He has been taking ibuprofen for pain.  No pre-existing injury.   History of a contralateral right tibia fracture requiring surgery.          REVIEW OF SYSTEMS  General: negative for, night sweats, dizziness, fatigue  Resp: No shortness of breath and no cough  CV: negative for chest pain, syncope or near-syncope  GI: negative for nausea, vomiting and diarrhea  : negative for dysuria and hematuria  Musculoskeletal: as above  Neurologic: negative for syncope   Hematologic: negative for bleeding disorder    Physical Exam:  Vitals: Temp 98.1  F (36.7  C) (Temporal)  Ht 6' (1.829 m)   Wt 227 lb (103 kg)  BMI 30.79 kg/m2  BMI= Body mass index is 30.79 kg/(m^2).  Constitutional: healthy, alert and no acute distress   Psychiatric: mentation appears normal and affect normal/bright  NEURO: no focal deficits  RESP: Normal with easy respirations and no use of accessory muscles to breathe, no audible wheezing or retractions  CV: LLE:  no edema         Regular rate and rhythm by palpation  SKIN: No erythema, rashes, excoriation, or breakdown. No evidence of infection.   JOINT/EXTREMITIES:left lower leg: No proximal tib-fib pain.  Very minimal medial malleolar pain.  No foot or Lisfranc's pain.  He does have some tenderness along the muscular tendinous junction of the Achilles.  He can actively dorsiflex and plantarflex his ankle.  He can do it against resistance although does cause pain.  GAIT: not tested             Diagnostic Modalities:  left ankle X-ray: Mortise is maintained.  There is no subluxation or dislocation.  Medial malleolar fragment is unchanged.  Independent visualization of the images was performed.      Impression: left medial malleolus with routine healing  Left calf strain    Plan:  All of the above pertinent physical exam and imaging modalities findings was reviewed with Jf.    Options discussed.  The majority of his tenderness is more along the muscular tendinous junction of the Achilles.  He can actively dorsiflex and plantarflex his ankle.  He has significantly improved since his fall.  He was in his fracture boot with a fall.  I discussed options including rest for the next few days.  If he continues to have pain along the Achilles by the end of the week I recommend a left ankle MRI to rule out partial Achilles tear.  He is agreeable to plan.  He will call us back.    Hold off on resuming activities.  Allow 1 week of  rest.  As he feels better he can start to progress his activity as per his last visit plan.      Return to clinic 1, week(s), or sooner as needed for  changes.  Re-x-ray on return: No    Antolin Nguyễn D.O.

## 2018-04-12 ENCOUNTER — OFFICE VISIT (OUTPATIENT)
Dept: ORTHOPEDICS | Facility: CLINIC | Age: 58
End: 2018-04-12
Payer: COMMERCIAL

## 2018-04-12 VITALS
HEIGHT: 72 IN | WEIGHT: 247 LBS | TEMPERATURE: 97.7 F | DIASTOLIC BLOOD PRESSURE: 82 MMHG | BODY MASS INDEX: 33.46 KG/M2 | SYSTOLIC BLOOD PRESSURE: 140 MMHG

## 2018-04-12 DIAGNOSIS — M76.62 ACHILLES TENDINITIS OF LEFT LOWER EXTREMITY: Primary | ICD-10-CM

## 2018-04-12 PROCEDURE — 99213 OFFICE O/P EST LOW 20 MIN: CPT | Performed by: ORTHOPAEDIC SURGERY

## 2018-04-12 ASSESSMENT — PAIN SCALES - GENERAL: PAINLEVEL: MODERATE PAIN (4)

## 2018-04-12 NOTE — NURSING NOTE
Chief Complaint   Patient presents with     RECHECK     left medial malleolus with routine healing, Left calf strain-DOI;2/10/2018       Initial /82 (BP Location: Right arm, Patient Position: Chair, Cuff Size: Adult Large)  Temp 97.7  F (36.5  C) (Temporal)  Ht 1.829 m (6')  Wt 112 kg (247 lb)  BMI 33.5 kg/m2 Estimated body mass index is 33.5 kg/(m^2) as calculated from the following:    Height as of this encounter: 1.829 m (6').    Weight as of this encounter: 112 kg (247 lb).  Medication Reconciliation: miryam Leo/JESSICA

## 2018-04-12 NOTE — MR AVS SNAPSHOT
"              After Visit Summary   2018    Jf Díaz    MRN: [id number]           Patient Information     Date Of Birth          1960        Visit Information        Provider Department      2018 8:40 AM Mannie Nguyễn, DO [facility]         Follow-ups after your visit        Who to contact     If you have questions or need follow up information about today's clinic visit or your schedule please contact [facility] directly at [phone].  Normal or non-critical lab and imaging results will be communicated to you by Flow Tradershart, letter or phone within 4 business days after the clinic has received the results. If you do not hear from us within 7 days, please contact the clinic through Flow Tr[employer] or phone. If you have a critical or abnormal lab result, we will notify you by phone as soon as possible.  Submit refill requests through Phizzle or call your pharmacy and they will forward the refill request to us. Please allow 3 business days for your refill to be completed.          Additional Information About Your Visit        [facility] Information     Phizzle lets you send messages to your doctor, view your test results, renew your prescriptions, schedule appointments and more. To sign up, go to [email or web address] . Click on \"Log in\" on the left side of the screen, which will take you to the Welcome page. Then click on \"Sign up Now\" on the right side of the page.     You will be asked to enter the access code listed below, as well as some personal information. Please follow the directions to create your username and password.     Your access code is: [email or web address]  Expires: 2018  8:45 AM     Your access code will  in 90 days. If you need help or a new code, please call your [facility] or [phone].        Care EveryWhere ID     This is your Care EveryWhere ID. This could be used by other organizations to access your [place] medical " records  [id number]        Your Vitals Were     Temperature Height BMI (Body Mass Index)             97.7  F (36.5  C) (Temporal) 1.829 m (6') 33.5 kg/m2          Blood Pressure from Last 3 Encounters:   04/12/18 140/82    Weight from Last 3 Encounters:   04/12/18 112 kg (247 lb)   02/12/18 103 kg (227 lb)   02/07/18 103 kg (227 lb)              Today, you had the following     No orders found for display       Primary Care Provider    Radiology Sh Non-Fv  Provider       No address on file        Equal Access to Services     [facility] TORI : Hadkori Tay, nedra palomo, demario mathews, ivis martinez . So [facility] [phone].    ATENCIÓN: Si habla español, tiene a brown disposición servicios gratuitos de asistencia lingüística. Llame al [phone].    We comply with applicable federal civil rights laws and [place] laws. We do not discriminate on the basis of race, color, national origin, age, disability, sex, sexual orientation, or gender identity.            Thank you!     Thank you for choosing [facility]  for your care. Our goal is always to provide you with excellent care. Hearing back from our patients is one way we can continue to improve our services. Please take a few minutes to complete the written survey that you may receive in the mail after your visit with us. Thank you!             Your Updated Medication List - Protect others around you: Learn how to safely use, store and throw away your medicines at www.disposemymeds.org.          This list is accurate as of 4/12/18  8:45 AM.  Always use your most recent med list.                   Brand Name Dispense Instructions for use Diagnosis    aspirin 325 MG tablet     90 tablet    Take 1 tablet (325 mg) by mouth 2 times daily Take 1 tablet twice daily until mobility returns to help prevent blood clots    Closed nondisplaced fracture of medial malleolus of left tibia with routine healing,  subsequent encounter

## 2018-04-27 ENCOUNTER — TELEPHONE (OUTPATIENT)
Dept: ORTHOPEDICS | Facility: CLINIC | Age: 58
End: 2018-04-27

## 2018-04-27 DIAGNOSIS — M76.62 ACHILLES TENDINITIS OF LEFT LOWER EXTREMITY: Primary | ICD-10-CM

## 2018-04-27 NOTE — TELEPHONE ENCOUNTER
The original order is stated to include lower leg with the ankle.  The comments of the MRI include that the MRI is to evaluate the Moscow's tendon and to include lower leg.  Change of order should not be needed.     Juilan Rosales APRN, CNP, DNP  Orthopedic Surgery

## 2018-04-27 NOTE — TELEPHONE ENCOUNTER
Reason for Call:  Other call back    Detailed comments: Patient states his ankle feels fine and is having more pain in his calf.  When he called to schedule MRI he was informed it for his ankle and now feels the issue is more in his middle calf and wants to make sure this area will be covered in the MRI.  Please call patient to advise when order is changed if necessary    Phone Number Patient can be reached at: Home number on file [phone] (home)    Best Time: any    Can we leave a detailed message on this number? YES    Call taken on 4/27/2018 at 12:50 PM by Jodee Devine

## 2018-05-03 NOTE — TELEPHONE ENCOUNTER
"I talked to Will and I am going to check with MRI just to make sure it will include lower leg.  I will call him back, if he does not answer then he states, \"its ok to leave a message.\"    Ahmet/JESSICA   "

## 2018-05-03 NOTE — TELEPHONE ENCOUNTER
I talked to Anshu and he said to order a tib/fib too.    Per Dr. Nguyễn this is ok to order.    Ahmet/JESSICA

## 2018-05-07 ENCOUNTER — HOSPITAL ENCOUNTER (OUTPATIENT)
Dept: MRI IMAGING | Facility: CLINIC | Age: 58
End: 2018-05-07
Attending: ORTHOPAEDIC SURGERY
Payer: COMMERCIAL

## 2018-05-07 ENCOUNTER — HOSPITAL ENCOUNTER (OUTPATIENT)
Dept: MRI IMAGING | Facility: CLINIC | Age: 58
Discharge: HOME | End: 2018-05-07
Attending: ORTHOPAEDIC SURGERY | Admitting: ORTHOPAEDIC SURGERY
Payer: COMMERCIAL

## 2018-05-07 DIAGNOSIS — M76.62 ACHILLES TENDINITIS OF LEFT LOWER EXTREMITY: ICD-10-CM

## 2018-05-07 PROCEDURE — 73718 MRI LOWER EXTREMITY W/O DYE: CPT | Mod: LT

## 2018-05-07 PROCEDURE — 73721 MRI JNT OF LWR EXTRE W/O DYE: CPT | Mod: LT

## 2018-05-23 ENCOUNTER — TELEPHONE (OUTPATIENT)
Dept: ORTHOPEDICS | Facility: CLINIC | Age: 58
End: 2018-05-23

## 2018-05-23 NOTE — TELEPHONE ENCOUNTER
Reason for Call:  Other call back    Detailed comments: MRI results - left ankle. Patient was told the protocol of making an appointment and getting results in clinic, patient states he was not told that. He declined appointments, stating it didn't work for his schedule and would like a call. Patient would like a call to see if he can have his results over the phone.     Phone Number Patient can be reached at: Cell number on file:     Jf Díaz (Self) [phone] (       No relevant phone numbers on file.       Best Time: asap     Can we leave a detailed message on this number? YES    Call taken on 5/23/2018 at 12:20 PM by Michelle Garsia

## 2018-05-24 NOTE — TELEPHONE ENCOUNTER
Called and updated about the MRI.  Recommended guarded activity.  If he has any pain he should discontinue it.  Otherwise continue his home exercises.  If he continues to have issues with that he should contact us and we will set him up to see .

## 2018-12-24 ENCOUNTER — HOSPITAL ENCOUNTER (EMERGENCY)
Facility: CLINIC | Age: 58
Discharge: HOME | End: 2018-12-24
Attending: EMERGENCY MEDICINE | Admitting: EMERGENCY MEDICINE
Payer: COMMERCIAL

## 2018-12-24 VITALS
RESPIRATION RATE: 16 BRPM | HEART RATE: 108 BPM | DIASTOLIC BLOOD PRESSURE: 94 MMHG | OXYGEN SATURATION: 96 % | TEMPERATURE: 97 F | WEIGHT: 262 LBS | SYSTOLIC BLOOD PRESSURE: 144 MMHG | HEIGHT: 73 IN | BODY MASS INDEX: 34.72 KG/M2

## 2018-12-24 DIAGNOSIS — J20.9 ACUTE BRONCHITIS WITH SYMPTOMS > 10 DAYS: ICD-10-CM

## 2018-12-24 DIAGNOSIS — J01.90 ACUTE SINUSITIS WITH SYMPTOMS > 10 DAYS: ICD-10-CM

## 2018-12-24 DIAGNOSIS — R04.2 HEMOPTYSIS: ICD-10-CM

## 2018-12-24 PROCEDURE — 94640 AIRWAY INHALATION TREATMENT: CPT

## 2018-12-24 PROCEDURE — 99283 EMERGENCY DEPT VISIT LOW MDM: CPT | Mod: 25 | Performed by: EMERGENCY MEDICINE

## 2018-12-24 PROCEDURE — 99284 EMERGENCY DEPT VISIT MOD MDM: CPT | Mod: Z6 | Performed by: EMERGENCY MEDICINE

## 2018-12-24 PROCEDURE — 25000125 ZZHC RX 250: Performed by: EMERGENCY MEDICINE

## 2018-12-24 RX ORDER — DOXYCYCLINE 100 MG/1
100 CAPSULE ORAL 2 TIMES DAILY
Qty: 20 CAPSULE | Refills: 0 | Status: SHIPPED | OUTPATIENT
Start: 2018-12-24 | End: 2019-01-03

## 2018-12-24 RX ORDER — PREDNISONE 20 MG/1
TABLET ORAL
Qty: 10 TABLET | Refills: 0 | Status: SHIPPED | OUTPATIENT
Start: 2018-12-24 | End: 2018-12-31

## 2018-12-24 RX ORDER — IPRATROPIUM BROMIDE AND ALBUTEROL SULFATE 2.5; .5 MG/3ML; MG/3ML
3 SOLUTION RESPIRATORY (INHALATION) ONCE
Status: COMPLETED | OUTPATIENT
Start: 2018-12-24 | End: 2018-12-24

## 2018-12-24 RX ORDER — HYDROCODONE BITARTRATE AND ACETAMINOPHEN 5; 325 MG/1; MG/1
1 TABLET ORAL EVERY 6 HOURS PRN
Qty: 10 TABLET | Refills: 0 | Status: SHIPPED | OUTPATIENT
Start: 2018-12-24 | End: 2018-12-27

## 2018-12-24 RX ADMIN — IPRATROPIUM BROMIDE AND ALBUTEROL SULFATE 3 ML: .5; 3 SOLUTION RESPIRATORY (INHALATION) at 10:24

## 2018-12-24 ASSESSMENT — MIFFLIN-ST. JEOR: SCORE: 2057.3

## 2018-12-24 NOTE — ED AVS SNAPSHOT
[facility] Emergency Department  [address] [place] [address]  Phone:  [phone]  Fax:  [phone]                                    Jf Díaz   MRN: [id number]    Department:  [facility] Emergency Department   Date of Visit:  12/24/2018           After Visit Summary Signature Page    I have received my discharge instructions, and my questions have been answered. I have discussed any challenges I see with this plan with the nurse or doctor.    ..........................................................................................................................................  Patient/Patient Representative Signature      ..........................................................................................................................................  Patient Representative Print Name and Relationship to Patient    ..................................................               ................................................  Date                                   Time    ..........................................................................................................................................  Reviewed by Signature/Title    ...................................................              ..............................................  Date                                               Time          22EPIC Rev 08/18

## 2018-12-24 NOTE — ED PROVIDER NOTES
"  History     Chief Complaint   Patient presents with     Hemoptysis     The history is provided by the patient.     Jf Díaz is a 58 year old male who presents to the emergency department with complaints of a cough. The patient woke up with a sore throat 9 days ago and by that night he had a cough and congestion. He tried to go to work for a few days, but was sent home. He lost his voice for a few days because of the cough. Patient says that the cough was nonproductive at first, but now is \"loosening up\". He is coughing up some blood in the mornings. He says the blood is \"strong\" in the beginning, but after 3-4 hours, it \"dissipates\". In the afternoons, his cough is productive of yellow/clear sputum. Patient has tried taking Mucinex, cough syrup, and Theraflu. He states \"my kidneys hurt, my lungs hurt, and it hurts to take a deep breath because I'm coughing so hard\". Patient has never used a nebulizer or inhaler before. Patient denies any nausea or vomiting. He has not had any bloody noses. The patient does not have a sore throat anymore. He still has a lot of congestion and has a headache. He has felt feverish, but has never taken his temperature. He has had normal bowel movements and urination. He has been eating and drinking normally. Patient is not a diabetic. He has never been a smoker.     Problem List:    Patient Active Problem List    Diagnosis Date Noted     Achilles tendinitis of left lower extremity 04/12/2018     Priority: Medium     Closed nondisplaced fracture of medial malleolus of left tibia, initial encounter 01/11/2018     Priority: Medium        Past Medical History:    No past medical history on file.    Past Surgical History:    No past surgical history on file.    Family History:    No family history on file.    Social History:  Marital Status:   [2]  Social History     Tobacco Use     Smoking status: Never Smoker     Smokeless tobacco: Never Used   Substance Use Topics     " "Alcohol use: Not on file     Drug use: Not on file        Medications:      doxycycline hyclate (VIBRAMYCIN) 100 MG capsule   HYDROcodone-acetaminophen (NORCO) 5-325 MG tablet   predniSONE (DELTASONE) 20 MG tablet         Review of Systems   All other ROS reviewed and are negative or non-contributory except as stated in HPI.    Physical Exam   BP: (!) 145/112  Pulse: 85  Temp: 97  F (36.1  C)  Resp: 16  Height: 185.4 cm (6' 1\")  Weight: 118.8 kg (262 lb)  SpO2: 97 %      Physical Exam   Constitutional: He is oriented to person, place, and time. He appears well-developed and well-nourished.   Healthy, congested sounding male sitting in the bed   HENT:   Head: Normocephalic.   Mouth/Throat: Oropharynx is clear and moist.   Air-fluid levels noted behind bilateral TM  Nasal congestion   Eyes: Conjunctivae and EOM are normal. Pupils are equal, round, and reactive to light.   Neck: Normal range of motion. Neck supple.   Anterior cervical lymphadenopathy   Cardiovascular: Regular rhythm, normal heart sounds and intact distal pulses.   Borderline tachycardia   Pulmonary/Chest: Effort normal. He has wheezes (End expiratory wheezes intermittently).   Abdominal: Soft. There is no tenderness.   Musculoskeletal: Normal range of motion. He exhibits no edema.   Neurological: He is alert and oriented to person, place, and time. He exhibits normal muscle tone.   Skin: Skin is warm and dry. He is not diaphoretic.   Psychiatric: He has a normal mood and affect. His behavior is normal.   Vitals reviewed.      ED Course (with Medical Decision Making)    Pt seen and examined by me.  RN and EPIC notes reviewed.      Patient with URI symptoms, some consistent with sinusitis, some concern likely bronchitis.  He is very congested, mildly tachycardic, lungs with wheezes, mild tachycardia.  With the length of his symptoms, the small amount of hemoptysis, and his exam I am going to treat him for a bronchitis/sinusitis.  He was given a neb in " the emergency department but he did not seem to think this helped with his symptoms.    I am going to give him some prednisone to help open up the airways, doxycycline for the sinusitis/bronchitis, and some Vicodin for pain and cough symptoms.  He has codeine allergy.  He should drink lots of fluids, continue rest.  Follow-up in clinic if not improving over the next few days.  Return for significant worsening, changes or concerns.       Medications   ipratropium - albuterol 0.5 mg/2.5 mg/3 mL (DUONEB) neb solution 3 mL (3 mLs Nebulization Given 12/24/18 1024)       Assessments & Plan     I have reviewed the findings, diagnosis, plan and need for follow up with the patient.       Medication List      Started    doxycycline hyclate 100 MG capsule  Commonly known as:  VIBRAMYCIN  100 mg, Oral, 2 TIMES DAILY     HYDROcodone-acetaminophen 5-325 MG tablet  Commonly known as:  NORCO  1 tablet, Oral, EVERY 6 HOURS PRN     predniSONE 20 MG tablet  Commonly known as:  DELTASONE  Take two tablets (= 40mg) each day for 5 (five) days            Final diagnoses:   Acute bronchitis with symptoms > 10 days   Acute sinusitis with symptoms > 10 days   Hemoptysis     Disposition: Patient discharged home in stable condition.  Plan as above.  Return for concerns.      This document serves as a record of services personally performed by Sisi Drummond MD. It was created on their behalf by Neida Blank, a trained medical scribe. The creation of this record is based on the provider's personal observations and the statements of the patient. This document has been checked and approved by the attending provider.  Note: Chart documentation done in part with Dragon Voice Recognition software. Although reviewed after completion, some word and grammatical errors may remain.    12/24/2018   [facility] EMERGENCY DEPARTMENT     Sisi Drummond MD  12/24/18 1933

## 2018-12-24 NOTE — DISCHARGE INSTRUCTIONS
Try to drink lots of fluids to help loosen secretions.  Get plenty of rest.    Prednisone as prescribed to help open the airways.  Take with food or milk.    Vicodin if needed for pain and cough.  No driving while on this medication.  It can cause constipation so take stool softeners if needed.    Antibiotics as prescribed.  All antibiotics have side effects including possible diarrhea.  You can take probiotics or eat yogurt while you are on this medication.    I hope that you start to improve quickly!    Have a very Merry Tolstoy!

## 2018-12-24 NOTE — ED TRIAGE NOTES
8 days of cough, now coughing up blood in the the AM, later in the day, coughing up yellow Mucous. Feelings of hot and cold and clammy. Did not get flu shot this year.

## 2018-12-24 NOTE — PROGRESS NOTES
Pt felt no change with neb tx.  BS were clear/diminished throughout.    William Garcia, RT on 12/24/2018 at 10:27 AM

## 2020-03-22 ENCOUNTER — APPOINTMENT (OUTPATIENT)
Dept: CT IMAGING | Facility: CLINIC | Age: 60
End: 2020-03-22
Attending: PHYSICIAN ASSISTANT
Payer: COMMERCIAL

## 2020-03-22 ENCOUNTER — HOSPITAL ENCOUNTER (EMERGENCY)
Facility: CLINIC | Age: 60
Discharge: HOME | End: 2020-03-22
Attending: PHYSICIAN ASSISTANT | Admitting: PHYSICIAN ASSISTANT
Payer: COMMERCIAL

## 2020-03-22 ENCOUNTER — APPOINTMENT (OUTPATIENT)
Dept: GENERAL RADIOLOGY | Facility: CLINIC | Age: 60
End: 2020-03-22
Attending: PHYSICIAN ASSISTANT
Payer: COMMERCIAL

## 2020-03-22 VITALS
BODY MASS INDEX: 34.85 KG/M2 | SYSTOLIC BLOOD PRESSURE: 140 MMHG | HEIGHT: 73 IN | RESPIRATION RATE: 16 BRPM | HEART RATE: 88 BPM | TEMPERATURE: 98.2 F | DIASTOLIC BLOOD PRESSURE: 90 MMHG | WEIGHT: 263 LBS | OXYGEN SATURATION: 95 %
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
## 2020-03-22 DIAGNOSIS — S23.9XXA THORACIC BACK SPRAIN: ICD-10-CM

## 2020-03-22 PROCEDURE — 25000128 H RX IP 250 OP 636: Performed by: PHYSICIAN ASSISTANT

## 2020-03-22 PROCEDURE — 99285 EMERGENCY DEPT VISIT HI MDM: CPT | Mod: Z6 | Performed by: PHYSICIAN ASSISTANT

## 2020-03-22 PROCEDURE — 96376 TX/PRO/DX INJ SAME DRUG ADON: CPT | Performed by: PHYSICIAN ASSISTANT

## 2020-03-22 PROCEDURE — 71046 X-RAY EXAM CHEST 2 VIEWS: CPT | Mod: TC

## 2020-03-22 PROCEDURE — 72128 CT CHEST SPINE W/O DYE: CPT

## 2020-03-22 PROCEDURE — 99285 EMERGENCY DEPT VISIT HI MDM: CPT | Mod: 25 | Performed by: PHYSICIAN ASSISTANT

## 2020-03-22 PROCEDURE — 96375 TX/PRO/DX INJ NEW DRUG ADDON: CPT | Performed by: PHYSICIAN ASSISTANT

## 2020-03-22 PROCEDURE — 96374 THER/PROPH/DIAG INJ IV PUSH: CPT | Performed by: PHYSICIAN ASSISTANT

## 2020-03-22 PROCEDURE — 25000132 ZZH RX MED GY IP 250 OP 250 PS 637: Performed by: PHYSICIAN ASSISTANT

## 2020-03-22 RX ORDER — DIAZEPAM 10 MG/2ML
5 INJECTION, SOLUTION INTRAMUSCULAR; INTRAVENOUS ONCE
Status: COMPLETED | OUTPATIENT
Start: 2020-03-22 | End: 2020-03-22

## 2020-03-22 RX ORDER — LIDOCAINE 4 G/G
2 PATCH TOPICAL ONCE
Status: DISCONTINUED | OUTPATIENT
Start: 2020-03-22 | End: 2020-03-23 | Stop reason: HOSPADM

## 2020-03-22 RX ORDER — OXYCODONE HYDROCHLORIDE 5 MG/1
5-10 TABLET ORAL EVERY 6 HOURS PRN
Qty: 12 TABLET | Refills: 0 | Status: SHIPPED | OUTPATIENT
Start: 2020-03-22

## 2020-03-22 RX ORDER — OXYCODONE HYDROCHLORIDE 5 MG/1
10 TABLET ORAL ONCE
Status: COMPLETED | OUTPATIENT
Start: 2020-03-22 | End: 2020-03-22

## 2020-03-22 RX ORDER — CYCLOBENZAPRINE HCL 10 MG
10 TABLET ORAL 3 TIMES DAILY PRN
Qty: 15 TABLET | Refills: 0 | Status: SHIPPED | OUTPATIENT
Start: 2020-03-22

## 2020-03-22 RX ORDER — HYDROMORPHONE HYDROCHLORIDE 1 MG/ML
0.5 INJECTION, SOLUTION INTRAMUSCULAR; INTRAVENOUS; SUBCUTANEOUS
Status: COMPLETED | OUTPATIENT
Start: 2020-03-22 | End: 2020-03-22

## 2020-03-22 RX ADMIN — DIAZEPAM 5 MG: 5 INJECTION, SOLUTION INTRAMUSCULAR; INTRAVENOUS at 19:20

## 2020-03-22 RX ADMIN — HYDROMORPHONE HYDROCHLORIDE 0.5 MG: 1 INJECTION, SOLUTION INTRAMUSCULAR; INTRAVENOUS; SUBCUTANEOUS at 20:18

## 2020-03-22 RX ADMIN — OXYCODONE HYDROCHLORIDE 10 MG: 5 TABLET ORAL at 21:35

## 2020-03-22 RX ADMIN — HYDROMORPHONE HYDROCHLORIDE 0.5 MG: 1 INJECTION, SOLUTION INTRAMUSCULAR; INTRAVENOUS; SUBCUTANEOUS at 21:03

## 2020-03-22 RX ADMIN — HYDROMORPHONE HYDROCHLORIDE 0.5 MG: 1 INJECTION, SOLUTION INTRAMUSCULAR; INTRAVENOUS; SUBCUTANEOUS at 19:25

## 2020-03-22 RX ADMIN — LIDOCAINE 2 PATCH: 560 PATCH PERCUTANEOUS; TOPICAL; TRANSDERMAL at 21:22

## 2020-03-22 ASSESSMENT — MIFFLIN-ST. JEOR: SCORE: 2051.84

## 2020-03-22 NOTE — ED AVS SNAPSHOT
[facility] Emergency Department  [address]   [place] [address]  Phone:  [phone]  Fax:  [phone]                                    Jf Díaz   MRN: [id number]    Department:  [facility] Emergency Department   Date of Visit:  3/22/2020           After Visit Summary Signature Page    I have received my discharge instructions, and my questions have been answered. I have discussed any challenges I see with this plan with the nurse or doctor.    ..........................................................................................................................................  Patient/Patient Representative Signature      ..........................................................................................................................................  Patient Representative Print Name and Relationship to Patient    ..................................................               ................................................  Date                                   Time    ..........................................................................................................................................  Reviewed by Signature/Title    ...................................................              ..............................................  Date                                               Time          22EPIC Rev 08/18

## 2020-03-22 NOTE — ED TRIAGE NOTES
States around 1600 was running backwards to catch a ball and tripped over as piece of machinary and landed on back. Having severe pain in mid back area. Did not hit head. Neck is a little sore.

## 2020-03-23 NOTE — DISCHARGE INSTRUCTIONS
It was a pleasure working with you today!  I hope your condition improves rapidly!     Please REST!  Do not lift anything over 5 pounds for the next 1 week.  Keep the Lidoderm patch on for up to 18 hours.  You can purchase these over-the-counter if it works well for you.  I would recommend taking 2 Aleve every 12 hours with food for the next 3 days and then as needed after that to treat the inflammation and the pain.  It is okay to use the cyclobenzaprine (muscle relaxer) and/or the oxycodone (pain medication) if the Aleve is not working.  Do not drive for 8 hours after taking either of these medications, as they do impair your judgment.  I would recommend icing the area for 20 minutes every couple hours or alternating this with heat.

## 2020-03-23 NOTE — ED NOTES
Pt states is already feeling much better. Still in recliner. Can recline as needed. Cont oximeter and BP cuff on

## 2020-03-23 NOTE — ED PROVIDER NOTES
History     Chief Complaint   Patient presents with     Back Pain     HPI  Jf Díaz is a 60 year old male who presents to the ED complaining of mid back pain. Patient stated around 1600 this evening he was playing catch out on his gravel driveway with his grandson and he went for the ball that was over his head and tripped backwards over a plow. He stated that he struck both of his calves and landed right unto his back, knocking the wind out of him. Patient stated that he did not hit his head and had a hard time getting up off the ground. He stated that they applied ice and he took an Aleve with little to no relief. Patient stated his most comfortable position is on his hands and knees. He stated it is hard to breathe when his muscles are spasming and with any movement. Patient rates his pain at a 6/10 when he is in a comfortable position and is a 10/10 with change of position.     Allergies:  Allergies   Allergen Reactions     Codeine Hives, Nausea and Itching       Problem List:    Patient Active Problem List    Diagnosis Date Noted     Achilles tendinitis of left lower extremity 04/12/2018     Priority: Medium     Closed nondisplaced fracture of medial malleolus of left tibia, initial encounter 01/11/2018     Priority: Medium        Past Medical History:    No past medical history on file.    Past Surgical History:    No past surgical history on file.    Family History:    No family history on file.    Social History:  Marital Status:   [4]  Social History     Tobacco Use     Smoking status: Never Smoker     Smokeless tobacco: Never Used   Substance Use Topics     Alcohol use: Not on file     Drug use: Not on file        Medications:    cyclobenzaprine (FLEXERIL) 10 MG tablet  oxyCODONE (ROXICODONE) 5 MG tablet          Review of Systems   All other systems reviewed and are negative.      Physical Exam   BP: (!) 146/101  Pulse: 97  Temp: 98.2  F (36.8  C)  Resp: 20  Height: 185.4 cm (6'  "1\")  Weight: 119.3 kg (263 lb)  SpO2: 96 %      Physical Exam  Vitals signs and nursing note reviewed.   Constitutional:       General: He is not in acute distress.     Appearance: He is not diaphoretic.   HENT:      Head: Normocephalic and atraumatic.      Right Ear: External ear normal.      Left Ear: External ear normal.      Nose: Nose normal.      Mouth/Throat:      Pharynx: No oropharyngeal exudate.   Eyes:      General: No scleral icterus.        Right eye: No discharge.         Left eye: No discharge.      Conjunctiva/sclera: Conjunctivae normal.      Pupils: Pupils are equal, round, and reactive to light.   Neck:      Musculoskeletal: Normal range of motion and neck supple.      Thyroid: No thyromegaly.   Cardiovascular:      Rate and Rhythm: Normal rate and regular rhythm.      Heart sounds: Normal heart sounds. No murmur.   Pulmonary:      Effort: Pulmonary effort is normal. No respiratory distress.      Breath sounds: Normal breath sounds. No wheezing or rales.   Chest:      Chest wall: No tenderness.   Abdominal:      General: Bowel sounds are normal. There is no distension.      Palpations: Abdomen is soft. There is no mass.      Tenderness: There is no abdominal tenderness. There is no guarding or rebound.   Musculoskeletal:      Comments: Very tender to palpation of the spinous process of T7-T9.  No significant paravertebral muscular tenderness.  No bruising or swelling.  No erythema.  No significant tenderness with AP and lateral compression of the chest wall.  Lung fields are clear with good breath sounds.  Cervical and thoracic spine without spinous process or paravertebral muscular tenderness.  Normal range of motion of the cervical and lumbar spine.   Lymphadenopathy:      Cervical: No cervical adenopathy.   Skin:     General: Skin is warm and dry.      Capillary Refill: Capillary refill takes less than 2 seconds.      Findings: No erythema or rash.   Neurological:      Mental Status: He is " alert and oriented to person, place, and time.      Cranial Nerves: No cranial nerve deficit.   Psychiatric:         Behavior: Behavior normal.         Thought Content: Thought content normal.         ED Course        Procedures               Critical Care time:  none               Results for orders placed or performed during the hospital encounter of 03/22/20 (from the past 24 hour(s))   CT Thoracic Spine w/o Contrast    Narrative    CT OF THE THORACIC SPINE WITHOUT CONTRAST   3/22/2020 8:05 PM     COMPARISON: None    HISTORY: Mid-back/T-spine pain, initial exam; fell on mid back, T8  spinous process severe pain     TECHNIQUE: Axial CT images of the thoracic spine were acquired without  intravenous contrast. Multiplanar reformations were created from the  axial source images.      FINDINGS:  There is mild right convex curvature of the thoracic spine  centered at the T8 level; alignment of the thoracic vertebrae is  otherwise normal. Vertebral body heights of the thoracic spine are  normal. There are flowing right anterolateral syndesmophytes from T4  down to L1 suggesting DISH. There are no fractures of the thoracic  spine. There is no spinal canal stenosis of the thoracic spine.      Impression    IMPRESSION: No evidence for fracture or traumatic malalignment of the  thoracic spine. No spinal canal stenosis of the thoracic spine.      Radiation dose for this scan was reduced using automated exposure  control, adjustment of the mA and/or kV according to patient size, or  iterative reconstruction technique    BETZAIDA IRIZARRY MD   XR Chest 2 Views    Narrative    XR CHEST 2 VW   3/22/2020 9:02 PM     HISTORY: posterior chest discomfort worse with deep breath, fall    COMPARISON: None.      Impression    IMPRESSION: No acute cardiopulmonary disease.    TINO RIVERA MD       Medications   Lidocaine (LIDOCARE) 4 % Patch 2 patch (2 patches Transdermal Patch/Med Applied 3/22/20 2122)   HYDROmorphone (PF) (DILAUDID)  injection 0.5 mg (0.5 mg Intravenous Given 3/22/20 2103)   diazepam (VALIUM) injection 5 mg (5 mg Intravenous Given 3/22/20 1920)   oxyCODONE (ROXICODONE) tablet 10 mg (10 mg Oral Given 3/22/20 2135)       Assessments & Plan (with Medical Decision Making)     Fall  Thoracic back sprain     60 year old male presents for evaluation of acute onset lower thoracic pain after tripping while shuffling backwards and falling directly onto the mid aspect of his thoracic spine and chest wall.  Pain is much worse with any movement or deep breath.  Rates his  pain 9 on a scale of 10.  Denies any real dyspnea.  Was not having any symptoms prior to his fall.  On exam blood pressure 140/90, temperature 98.2, pulse 88, respirations 16, and oxygen sats are 95% on room air.  Patient appears to be in discomfort.  He is very tender palpation over the T7-T9 area spinous processes and mild involvement of the paravertebral musculature.  No bruising, step-off, crepitus, or erythema over the area.  Cervical and lumbar spine were normal.  No real discomfort with AP and lateral compression of the chest wall.  Lung fields are clear.  IV was established to help him with pain management prior to imaging evaluation.  IV Dilaudid and IV Valium provided with good success.  CT of the thoracic spine initially done which did not show any evidence for acute fracture.  The patient has rather impressive anterior spurs extending from T4 all the way down to L1.  Chest x-ray performed without evidence for pneumothorax, pleural effusion, infiltrate, or rib fracture.  We did transition him to oral pain medication in the form of oxycodone while he was here in the ED.  He tolerated this well.  Lidoderm patches were applied to the painful area.  Conservative care measures discussed.  Reassured that no fractures or more serious etiology for his discomfort was identified.  I am concerned that disruption of his rather significant anterior bone spurs with this injury  is a source of his discomfort.  Rest recommended.  Ice alternated with heat.  Do not lift over 5 pounds.  Patient states that he is laid off work at this time so he does not need a note.  Cyclobenzaprine as needed for muscle spasm.  Oxycodone for severe breakthrough pain.  Possible side effects of these medications discussed.  No driving for 8 hours after taking the medication.  Follow-up with PCP if not improving or worsening.  Return instructions to the ED reviewed with him.  Patient had a family member come pick him up in the ED to drive him home.  He was in agreement with this plan and was suitable for discharge.     I have reviewed the nursing notes.    I have reviewed the findings, diagnosis, plan and need for follow up with the patient.       Discharge Medication List as of 3/22/2020  9:38 PM      START taking these medications    Details   cyclobenzaprine (FLEXERIL) 10 MG tablet Take 1 tablet (10 mg) by mouth 3 times daily as needed for muscle spasms, Disp-15 tablet,R-0, E-Prescribe      oxyCODONE (ROXICODONE) 5 MG tablet Take 1-2 tablets (5-10 mg) by mouth every 6 hours as needed for severe pain, Disp-12 tablet,R-0, Local Print               Final diagnoses:   Fall   Thoracic back sprain   This document serves as a record of services personally performed by Raj Bertrand PA.  It was created on their behalf by Ninoska Mendez, a trained medical scribe. The creation of this record is based on the provider's personal observations and the statements of the patient. This document has been checked and approved by the attending provider.    Disclaimer : This note consists of symbols derived from keyboarding, dictation and/or voice recognition software. As a result, there may be errors in the script that have gone undetected. Please consider this when interpreting information found in this chart.      3/22/2020   Raj Bertrand PA-C   [facility] EMERGENCY DEPARTMENT     Raj Bertrand PA-C  03/22/20  [id number]

## 2022-04-11 ENCOUNTER — HOSPITAL ENCOUNTER (OUTPATIENT)
Facility: CLINIC | Age: 62
Discharge: HOME | End: 2022-04-12
Attending: UROLOGY | Admitting: UROLOGY
Payer: COMMERCIAL

## 2022-04-11 ENCOUNTER — HOSPITAL ENCOUNTER (EMERGENCY)
Facility: CLINIC | Age: 62
Discharge: ANOTHER HOSPITAL | End: 2022-04-11
Attending: EMERGENCY MEDICINE | Admitting: EMERGENCY MEDICINE
Payer: COMMERCIAL

## 2022-04-11 ENCOUNTER — ANESTHESIA (OUTPATIENT)
Dept: SURGERY | Facility: CLINIC | Age: 62
End: 2022-04-11
Payer: COMMERCIAL

## 2022-04-11 ENCOUNTER — ANESTHESIA EVENT (OUTPATIENT)
Dept: SURGERY | Facility: CLINIC | Age: 62
End: 2022-04-11
Payer: COMMERCIAL

## 2022-04-11 VITALS
RESPIRATION RATE: 18 BRPM | HEART RATE: 84 BPM | DIASTOLIC BLOOD PRESSURE: 109 MMHG | TEMPERATURE: 98.3 F | OXYGEN SATURATION: 98 % | SYSTOLIC BLOOD PRESSURE: 160 MMHG

## 2022-04-11 DIAGNOSIS — S39.840A FRACTURE OF CORPUS CAVERNOSUM PENIS, INITIAL ENCOUNTER: Primary | ICD-10-CM

## 2022-04-11 DIAGNOSIS — S39.840A FRACTURE OF CORPUS CAVERNOSUM PENIS, INITIAL ENCOUNTER: ICD-10-CM

## 2022-04-11 LAB
ANION GAP SERPL CALCULATED.3IONS-SCNC: 4 MMOL/L (ref 3–14)
BASOPHILS # BLD AUTO: 0.1 10E3/UL (ref 0–0.2)
BASOPHILS NFR BLD AUTO: 1 %
BUN SERPL-MCNC: 17 MG/DL (ref 7–30)
CALCIUM SERPL-MCNC: 9.2 MG/DL (ref 8.5–10.1)
CHLORIDE BLD-SCNC: 107 MMOL/L (ref 94–109)
CO2 SERPL-SCNC: 30 MMOL/L (ref 20–32)
CREAT SERPL-MCNC: 1.23 MG/DL (ref 0.66–1.25)
EOSINOPHIL # BLD AUTO: 0.2 10E3/UL (ref 0–0.7)
EOSINOPHIL NFR BLD AUTO: 2 %
ERYTHROCYTE [DISTWIDTH] IN BLOOD BY AUTOMATED COUNT: 12.4 % (ref 10–15)
GFR SERPL CREATININE-BSD FRML MDRD: 66 ML/MIN/1.73M2
GLUCOSE BLD-MCNC: 106 MG/DL (ref 70–99)
HCT VFR BLD AUTO: 44.2 % (ref 40–53)
HGB BLD-MCNC: 15.7 G/DL (ref 13.3–17.7)
IMM GRANULOCYTES # BLD: 0 10E3/UL
IMM GRANULOCYTES NFR BLD: 1 %
LYMPHOCYTES # BLD AUTO: 2.7 10E3/UL (ref 0.8–5.3)
LYMPHOCYTES NFR BLD AUTO: 33 %
MCH RBC QN AUTO: 31 PG (ref 26.5–33)
MCHC RBC AUTO-ENTMCNC: 35.5 G/DL (ref 31.5–36.5)
MCV RBC AUTO: 87 FL (ref 78–100)
MONOCYTES # BLD AUTO: 0.9 10E3/UL (ref 0–1.3)
MONOCYTES NFR BLD AUTO: 11 %
NEUTROPHILS # BLD AUTO: 4.3 10E3/UL (ref 1.6–8.3)
NEUTROPHILS NFR BLD AUTO: 52 %
NRBC # BLD AUTO: 0 10E3/UL
NRBC BLD AUTO-RTO: 0 /100
PLATELET # BLD AUTO: 193 10E3/UL (ref 150–450)
POTASSIUM BLD-SCNC: 4 MMOL/L (ref 3.4–5.3)
RBC # BLD AUTO: 5.07 10E6/UL (ref 4.4–5.9)
SARS-COV-2 RNA RESP QL NAA+PROBE: NEGATIVE
SODIUM SERPL-SCNC: 141 MMOL/L (ref 133–144)
WBC # BLD AUTO: 8.1 10E3/UL (ref 4–11)

## 2022-04-11 PROCEDURE — 80048 BASIC METABOLIC PNL TOTAL CA: CPT | Performed by: EMERGENCY MEDICINE

## 2022-04-11 PROCEDURE — 36415 COLL VENOUS BLD VENIPUNCTURE: CPT | Performed by: EMERGENCY MEDICINE

## 2022-04-11 PROCEDURE — 99285 EMERGENCY DEPT VISIT HI MDM: CPT | Performed by: EMERGENCY MEDICINE

## 2022-04-11 PROCEDURE — 99284 EMERGENCY DEPT VISIT MOD MDM: CPT | Performed by: EMERGENCY MEDICINE

## 2022-04-11 PROCEDURE — 87635 SARS-COV-2 COVID-19 AMP PRB: CPT | Performed by: EMERGENCY MEDICINE

## 2022-04-11 PROCEDURE — 999N000104 HC STATISTIC NO CHARGE

## 2022-04-11 PROCEDURE — C9803 HOPD COVID-19 SPEC COLLECT: HCPCS | Performed by: EMERGENCY MEDICINE

## 2022-04-11 PROCEDURE — 85004 AUTOMATED DIFF WBC COUNT: CPT | Performed by: EMERGENCY MEDICINE

## 2022-04-11 RX ORDER — IBUPROFEN 200 MG
400 TABLET ORAL EVERY 4 HOURS PRN
COMMUNITY

## 2022-04-11 RX ORDER — SODIUM CHLORIDE, SODIUM LACTATE, POTASSIUM CHLORIDE, CALCIUM CHLORIDE 600; 310; 30; 20 MG/100ML; MG/100ML; MG/100ML; MG/100ML
INJECTION, SOLUTION INTRAVENOUS CONTINUOUS
Status: DISCONTINUED | OUTPATIENT
Start: 2022-04-11 | End: 2022-04-12 | Stop reason: HOSPADM

## 2022-04-12 VITALS
DIASTOLIC BLOOD PRESSURE: 83 MMHG | WEIGHT: 283.73 LBS | RESPIRATION RATE: 20 BRPM | OXYGEN SATURATION: 93 % | HEART RATE: 69 BPM | HEIGHT: 73 IN | TEMPERATURE: 98.1 F | BODY MASS INDEX: 37.6 KG/M2 | SYSTOLIC BLOOD PRESSURE: 136 MMHG

## 2022-04-12 PROBLEM — S39.840A PENILE FRACTURE: Status: ACTIVE | Noted: 2022-04-12

## 2022-04-12 PROCEDURE — 999N000141 HC STATISTIC PRE-PROCEDURE NURSING ASSESSMENT: Performed by: UROLOGY

## 2022-04-12 PROCEDURE — 370N000017 HC ANESTHESIA TECHNICAL FEE, PER MIN: Performed by: UROLOGY

## 2022-04-12 PROCEDURE — 258N000003 HC RX IP 258 OP 636: Performed by: ANESTHESIOLOGY

## 2022-04-12 PROCEDURE — 250N000013 HC RX MED GY IP 250 OP 250 PS 637: Performed by: UROLOGY

## 2022-04-12 PROCEDURE — 360N000075 HC SURGERY LEVEL 2, PER MIN: Performed by: UROLOGY

## 2022-04-12 PROCEDURE — 272N000001 HC OR GENERAL SUPPLY STERILE: Performed by: UROLOGY

## 2022-04-12 PROCEDURE — 99204 OFFICE O/P NEW MOD 45 MIN: CPT | Mod: 25 | Performed by: UROLOGY

## 2022-04-12 PROCEDURE — 250N000013 HC RX MED GY IP 250 OP 250 PS 637: Performed by: STUDENT IN AN ORGANIZED HEALTH CARE EDUCATION/TRAINING PROGRAM

## 2022-04-12 PROCEDURE — 250N000009 HC RX 250: Performed by: NURSE ANESTHETIST, CERTIFIED REGISTERED

## 2022-04-12 PROCEDURE — 250N000011 HC RX IP 250 OP 636: Performed by: UROLOGY

## 2022-04-12 PROCEDURE — 55899 UNLISTED PX MALE GENITAL SYS: CPT | Performed by: UROLOGY

## 2022-04-12 PROCEDURE — 710N000010 HC RECOVERY PHASE 1, LEVEL 2, PER MIN: Performed by: UROLOGY

## 2022-04-12 PROCEDURE — 250N000025 HC SEVOFLURANE, PER MIN: Performed by: UROLOGY

## 2022-04-12 PROCEDURE — 258N000003 HC RX IP 258 OP 636: Performed by: NURSE ANESTHETIST, CERTIFIED REGISTERED

## 2022-04-12 PROCEDURE — 250N000011 HC RX IP 250 OP 636: Performed by: NURSE ANESTHETIST, CERTIFIED REGISTERED

## 2022-04-12 RX ORDER — POLYETHYLENE GLYCOL 3350 17 G/17G
17 POWDER, FOR SOLUTION ORAL 2 TIMES DAILY
Status: DISCONTINUED | OUTPATIENT
Start: 2022-04-12 | End: 2022-04-12 | Stop reason: HOSPADM

## 2022-04-12 RX ORDER — CALCIUM CARBONATE 500 MG/1
500 TABLET, CHEWABLE ORAL 4 TIMES DAILY PRN
Status: DISCONTINUED | OUTPATIENT
Start: 2022-04-12 | End: 2022-04-12 | Stop reason: HOSPADM

## 2022-04-12 RX ORDER — NEOMYCIN/BACITRACIN/POLYMYXINB 3.5-400-5K
OINTMENT (GRAM) TOPICAL PRN
Status: DISCONTINUED | OUTPATIENT
Start: 2022-04-12 | End: 2022-04-12 | Stop reason: HOSPADM

## 2022-04-12 RX ORDER — OXYCODONE HYDROCHLORIDE 5 MG/1
5 TABLET ORAL EVERY 4 HOURS PRN
Status: DISCONTINUED | OUTPATIENT
Start: 2022-04-12 | End: 2022-04-12 | Stop reason: HOSPADM

## 2022-04-12 RX ORDER — ONDANSETRON 4 MG/1
4 TABLET, ORALLY DISINTEGRATING ORAL EVERY 6 HOURS PRN
Status: DISCONTINUED | OUTPATIENT
Start: 2022-04-12 | End: 2022-04-12 | Stop reason: HOSPADM

## 2022-04-12 RX ORDER — NALOXONE HYDROCHLORIDE 0.4 MG/ML
0.4 INJECTION, SOLUTION INTRAMUSCULAR; INTRAVENOUS; SUBCUTANEOUS
Status: DISCONTINUED | OUTPATIENT
Start: 2022-04-12 | End: 2022-04-12 | Stop reason: HOSPADM

## 2022-04-12 RX ORDER — HYDROMORPHONE HCL IN WATER/PF 6 MG/30 ML
0.2 PATIENT CONTROLLED ANALGESIA SYRINGE INTRAVENOUS EVERY 5 MIN PRN
Status: CANCELLED | OUTPATIENT
Start: 2022-04-12

## 2022-04-12 RX ORDER — HYDROMORPHONE HCL IN WATER/PF 6 MG/30 ML
0.4 PATIENT CONTROLLED ANALGESIA SYRINGE INTRAVENOUS
Status: DISCONTINUED | OUTPATIENT
Start: 2022-04-12 | End: 2022-04-12 | Stop reason: HOSPADM

## 2022-04-12 RX ORDER — ONDANSETRON 2 MG/ML
4 INJECTION INTRAMUSCULAR; INTRAVENOUS EVERY 6 HOURS PRN
Status: DISCONTINUED | OUTPATIENT
Start: 2022-04-12 | End: 2022-04-12 | Stop reason: HOSPADM

## 2022-04-12 RX ORDER — SODIUM CHLORIDE, SODIUM LACTATE, POTASSIUM CHLORIDE, CALCIUM CHLORIDE 600; 310; 30; 20 MG/100ML; MG/100ML; MG/100ML; MG/100ML
INJECTION, SOLUTION INTRAVENOUS CONTINUOUS
Status: DISCONTINUED | OUTPATIENT
Start: 2022-04-12 | End: 2022-04-12 | Stop reason: HOSPADM

## 2022-04-12 RX ORDER — NALOXONE HYDROCHLORIDE 0.4 MG/ML
0.2 INJECTION, SOLUTION INTRAMUSCULAR; INTRAVENOUS; SUBCUTANEOUS
Status: DISCONTINUED | OUTPATIENT
Start: 2022-04-12 | End: 2022-04-12 | Stop reason: HOSPADM

## 2022-04-12 RX ORDER — LIDOCAINE HYDROCHLORIDE 20 MG/ML
INJECTION, SOLUTION INFILTRATION; PERINEURAL PRN
Status: DISCONTINUED | OUTPATIENT
Start: 2022-04-12 | End: 2022-04-12

## 2022-04-12 RX ORDER — MEPERIDINE HYDROCHLORIDE 25 MG/ML
12.5 INJECTION INTRAMUSCULAR; INTRAVENOUS; SUBCUTANEOUS
Status: DISCONTINUED | OUTPATIENT
Start: 2022-04-12 | End: 2022-04-12 | Stop reason: HOSPADM

## 2022-04-12 RX ORDER — FENTANYL CITRATE 50 UG/ML
INJECTION, SOLUTION INTRAMUSCULAR; INTRAVENOUS PRN
Status: DISCONTINUED | OUTPATIENT
Start: 2022-04-12 | End: 2022-04-12

## 2022-04-12 RX ORDER — OXYCODONE HYDROCHLORIDE 10 MG/1
10 TABLET ORAL EVERY 4 HOURS PRN
Status: DISCONTINUED | OUTPATIENT
Start: 2022-04-12 | End: 2022-04-12 | Stop reason: HOSPADM

## 2022-04-12 RX ORDER — ACETAMINOPHEN 325 MG/1
325-650 TABLET ORAL EVERY 6 HOURS PRN
Qty: 112 TABLET | Refills: 0 | Status: SHIPPED | OUTPATIENT
Start: 2022-04-12 | End: 2022-04-26

## 2022-04-12 RX ORDER — LIDOCAINE 40 MG/G
CREAM TOPICAL
Status: DISCONTINUED | OUTPATIENT
Start: 2022-04-12 | End: 2022-04-12 | Stop reason: HOSPADM

## 2022-04-12 RX ORDER — HYDROMORPHONE HCL IN WATER/PF 6 MG/30 ML
0.2 PATIENT CONTROLLED ANALGESIA SYRINGE INTRAVENOUS
Status: DISCONTINUED | OUTPATIENT
Start: 2022-04-12 | End: 2022-04-12 | Stop reason: HOSPADM

## 2022-04-12 RX ORDER — FENTANYL CITRATE 50 UG/ML
25 INJECTION, SOLUTION INTRAMUSCULAR; INTRAVENOUS EVERY 5 MIN PRN
Status: CANCELLED | OUTPATIENT
Start: 2022-04-12

## 2022-04-12 RX ORDER — MEPERIDINE HYDROCHLORIDE 25 MG/ML
12.5 INJECTION INTRAMUSCULAR; INTRAVENOUS; SUBCUTANEOUS
Status: CANCELLED | OUTPATIENT
Start: 2022-04-12

## 2022-04-12 RX ORDER — ACETAMINOPHEN 325 MG/1
650 TABLET ORAL EVERY 6 HOURS
Status: DISCONTINUED | OUTPATIENT
Start: 2022-04-12 | End: 2022-04-12 | Stop reason: HOSPADM

## 2022-04-12 RX ORDER — ONDANSETRON 2 MG/ML
4 INJECTION INTRAMUSCULAR; INTRAVENOUS EVERY 30 MIN PRN
Status: CANCELLED | OUTPATIENT
Start: 2022-04-12

## 2022-04-12 RX ORDER — PROPOFOL 10 MG/ML
INJECTION, EMULSION INTRAVENOUS PRN
Status: DISCONTINUED | OUTPATIENT
Start: 2022-04-12 | End: 2022-04-12

## 2022-04-12 RX ORDER — SODIUM CHLORIDE, SODIUM LACTATE, POTASSIUM CHLORIDE, CALCIUM CHLORIDE 600; 310; 30; 20 MG/100ML; MG/100ML; MG/100ML; MG/100ML
INJECTION, SOLUTION INTRAVENOUS CONTINUOUS
Status: CANCELLED | OUTPATIENT
Start: 2022-04-12

## 2022-04-12 RX ORDER — OXYCODONE HYDROCHLORIDE 5 MG/1
5 TABLET ORAL EVERY 4 HOURS PRN
Status: CANCELLED | OUTPATIENT
Start: 2022-04-12

## 2022-04-12 RX ORDER — ONDANSETRON 2 MG/ML
4 INJECTION INTRAMUSCULAR; INTRAVENOUS EVERY 30 MIN PRN
Status: DISCONTINUED | OUTPATIENT
Start: 2022-04-12 | End: 2022-04-12 | Stop reason: HOSPADM

## 2022-04-12 RX ORDER — CEFAZOLIN SODIUM 1 G/3ML
INJECTION, POWDER, FOR SOLUTION INTRAMUSCULAR; INTRAVENOUS PRN
Status: DISCONTINUED | OUTPATIENT
Start: 2022-04-12 | End: 2022-04-12

## 2022-04-12 RX ORDER — FENTANYL CITRATE 50 UG/ML
25 INJECTION, SOLUTION INTRAMUSCULAR; INTRAVENOUS EVERY 5 MIN PRN
Status: DISCONTINUED | OUTPATIENT
Start: 2022-04-12 | End: 2022-04-12 | Stop reason: CLARIF

## 2022-04-12 RX ORDER — FENTANYL CITRATE 50 UG/ML
25 INJECTION, SOLUTION INTRAMUSCULAR; INTRAVENOUS
Status: DISCONTINUED | OUTPATIENT
Start: 2022-04-12 | End: 2022-04-12 | Stop reason: HOSPADM

## 2022-04-12 RX ORDER — HYDROMORPHONE HYDROCHLORIDE 1 MG/ML
0.2 INJECTION, SOLUTION INTRAMUSCULAR; INTRAVENOUS; SUBCUTANEOUS EVERY 5 MIN PRN
Status: DISCONTINUED | OUTPATIENT
Start: 2022-04-12 | End: 2022-04-12 | Stop reason: CLARIF

## 2022-04-12 RX ORDER — ONDANSETRON 4 MG/1
4 TABLET, ORALLY DISINTEGRATING ORAL EVERY 30 MIN PRN
Status: DISCONTINUED | OUTPATIENT
Start: 2022-04-12 | End: 2022-04-12 | Stop reason: HOSPADM

## 2022-04-12 RX ORDER — BUPIVACAINE HYDROCHLORIDE 2.5 MG/ML
INJECTION, SOLUTION INFILTRATION; PERINEURAL PRN
Status: DISCONTINUED | OUTPATIENT
Start: 2022-04-12 | End: 2022-04-12 | Stop reason: HOSPADM

## 2022-04-12 RX ORDER — ONDANSETRON 4 MG/1
4 TABLET, ORALLY DISINTEGRATING ORAL EVERY 30 MIN PRN
Status: CANCELLED | OUTPATIENT
Start: 2022-04-12

## 2022-04-12 RX ORDER — FENTANYL CITRATE 50 UG/ML
25 INJECTION, SOLUTION INTRAMUSCULAR; INTRAVENOUS
Status: CANCELLED | OUTPATIENT
Start: 2022-04-12

## 2022-04-12 RX ORDER — ONDANSETRON 2 MG/ML
INJECTION INTRAMUSCULAR; INTRAVENOUS PRN
Status: DISCONTINUED | OUTPATIENT
Start: 2022-04-12 | End: 2022-04-12

## 2022-04-12 RX ADMIN — PHENYLEPHRINE HYDROCHLORIDE 100 MCG: 10 INJECTION INTRAVENOUS at 03:25

## 2022-04-12 RX ADMIN — PROPOFOL 50 MG: 10 INJECTION, EMULSION INTRAVENOUS at 02:55

## 2022-04-12 RX ADMIN — ROCURONIUM BROMIDE 40 MG: 50 INJECTION, SOLUTION INTRAVENOUS at 02:48

## 2022-04-12 RX ADMIN — SODIUM CHLORIDE, POTASSIUM CHLORIDE, SODIUM LACTATE AND CALCIUM CHLORIDE: 600; 310; 30; 20 INJECTION, SOLUTION INTRAVENOUS at 02:38

## 2022-04-12 RX ADMIN — ACETAMINOPHEN 650 MG: 325 TABLET ORAL at 12:39

## 2022-04-12 RX ADMIN — SUGAMMADEX 200 MG: 100 INJECTION, SOLUTION INTRAVENOUS at 04:18

## 2022-04-12 RX ADMIN — PHENYLEPHRINE HYDROCHLORIDE 200 MCG: 10 INJECTION INTRAVENOUS at 03:10

## 2022-04-12 RX ADMIN — ACETAMINOPHEN 650 MG: 325 TABLET ORAL at 06:43

## 2022-04-12 RX ADMIN — ONDANSETRON 4 MG: 2 INJECTION INTRAMUSCULAR; INTRAVENOUS at 03:45

## 2022-04-12 RX ADMIN — PHENYLEPHRINE HYDROCHLORIDE 200 MCG: 10 INJECTION INTRAVENOUS at 03:04

## 2022-04-12 RX ADMIN — LIDOCAINE HYDROCHLORIDE 100 MG: 20 INJECTION, SOLUTION INFILTRATION; PERINEURAL at 02:48

## 2022-04-12 RX ADMIN — PHENYLEPHRINE HYDROCHLORIDE 0.5 MCG/KG/MIN: 10 INJECTION INTRAVENOUS at 03:10

## 2022-04-12 RX ADMIN — OXYCODONE HYDROCHLORIDE 5 MG: 5 TABLET ORAL at 09:06

## 2022-04-12 RX ADMIN — PHENYLEPHRINE HYDROCHLORIDE 100 MCG: 10 INJECTION INTRAVENOUS at 03:30

## 2022-04-12 RX ADMIN — FENTANYL CITRATE 50 MCG: 50 INJECTION, SOLUTION INTRAMUSCULAR; INTRAVENOUS at 04:00

## 2022-04-12 RX ADMIN — CEFAZOLIN 3 G: 1 INJECTION, POWDER, FOR SOLUTION INTRAMUSCULAR; INTRAVENOUS at 03:05

## 2022-04-12 RX ADMIN — PHENYLEPHRINE HYDROCHLORIDE 100 MCG: 10 INJECTION INTRAVENOUS at 03:01

## 2022-04-12 RX ADMIN — FENTANYL CITRATE 50 MCG: 50 INJECTION, SOLUTION INTRAMUSCULAR; INTRAVENOUS at 02:48

## 2022-04-12 RX ADMIN — PROPOFOL 300 MG: 10 INJECTION, EMULSION INTRAVENOUS at 02:48

## 2022-04-12 RX ADMIN — SODIUM CHLORIDE, POTASSIUM CHLORIDE, SODIUM LACTATE AND CALCIUM CHLORIDE: 600; 310; 30; 20 INJECTION, SOLUTION INTRAVENOUS at 04:15

## 2022-04-12 RX ADMIN — MIDAZOLAM 2 MG: 1 INJECTION INTRAMUSCULAR; INTRAVENOUS at 02:48

## 2022-04-12 RX ADMIN — PHENYLEPHRINE HYDROCHLORIDE 100 MCG: 10 INJECTION INTRAVENOUS at 03:21

## 2022-04-12 NOTE — ED PROVIDER NOTES
History     Chief Complaint   Patient presents with     Groin Pain     HPI  Jf Díaz is a 62 year old male who presents with a penile injury.  He was having intercourse 3 nights ago when he states he missed and his penis bent awkwardly.  He had sudden onset of significant pain.  He stopped having intercourse.  He woke up a few hours later and it was quite swollen with large sacks of blood.  He was able to urinate.  He stayed home the last couple days because he has a dog with cancer has been taken care of.  He has not had an erection since that time.  Swelling is somewhat improved.  There is some pain along the left side of the penis.    Allergies:  Allergies   Allergen Reactions     Codeine Hives, Nausea and Itching       Problem List:    Patient Active Problem List    Diagnosis Date Noted     Achilles tendinitis of left lower extremity 04/12/2018     Priority: Medium     Closed nondisplaced fracture of medial malleolus of left tibia, initial encounter 01/11/2018     Priority: Medium        Past Medical History:    No past medical history on file.    Past Surgical History:    No past surgical history on file.    Family History:    No family history on file.    Social History:  Marital Status:   [4]  Social History     Tobacco Use     Smoking status: Never Smoker     Smokeless tobacco: Never Used        Medications:    cyclobenzaprine (FLEXERIL) 10 MG tablet  oxyCODONE (ROXICODONE) 5 MG tablet          Review of Systems  All other systems are reviewed and are negative    Physical Exam   BP: (!) 160/109  Pulse: 84  Temp: 98.3  F (36.8  C)  Resp: 18  SpO2: 98 %      Physical Exam  Vitals and nursing note reviewed.   Constitutional:       General: He is not in acute distress.     Appearance: He is well-developed. He is not diaphoretic.   HENT:      Head: Normocephalic and atraumatic.   Eyes:      General: No scleral icterus.        Right eye: No discharge.         Left eye: No discharge.       Conjunctiva/sclera: Conjunctivae normal.   Cardiovascular:      Rate and Rhythm: Normal rate and regular rhythm.      Heart sounds: Normal heart sounds. No murmur heard.  Pulmonary:      Effort: Pulmonary effort is normal. No respiratory distress.      Breath sounds: Normal breath sounds. No stridor.   Abdominal:      Palpations: Abdomen is soft.      Tenderness: There is no abdominal tenderness.   Genitourinary:     Comments: Diffuse ecchymosis along the shaft of the penis.  Tenderness moderate along the left side.  Swelling and ecchymosis into the superior scrotal region.  Musculoskeletal:         General: Normal range of motion.      Cervical back: Normal range of motion and neck supple.   Skin:     General: Skin is warm and dry.      Coloration: Skin is not pale.      Findings: No erythema or rash.   Neurological:      Mental Status: He is alert.      Cranial Nerves: No cranial nerve deficit.      Motor: No abnormal muscle tone.         ED Course                 Procedures               Pre-OP  Clearance     Please see the body of above note or dictation for the history and physical.      No medical contraindication to emergent surgery or anesthesia identified. No additional modifiable risk identified.  Informed consent deferred to surgeon.         Critical Care time:  none               Results for orders placed or performed during the hospital encounter of 04/11/22 (from the past 24 hour(s))   CBC with platelets differential    Narrative    The following orders were created for panel order CBC with platelets differential.  Procedure                               Abnormality         Status                     ---------                               -----------         ------                     CBC with platelets and d...[[id number]]                      Final result                 Please view results for these tests on the individual orders.   CBC with platelets and differential   Result Value Ref Range    WBC  Count 8.1 4.0 - 11.0 10e3/uL    RBC Count 5.07 4.40 - 5.90 10e6/uL    Hemoglobin 15.7 13.3 - 17.7 g/dL    Hematocrit 44.2 40.0 - 53.0 %    MCV 87 78 - 100 fL    MCH 31.0 26.5 - 33.0 pg    MCHC 35.5 31.5 - 36.5 g/dL    RDW 12.4 10.0 - 15.0 %    Platelet Count 193 150 - 450 10e3/uL    % Neutrophils 52 %    % Lymphocytes 33 %    % Monocytes 11 %    % Eosinophils 2 %    % Basophils 1 %    % Immature Granulocytes 1 %    NRBCs per 100 WBC 0 <1 /100    Absolute Neutrophils 4.3 1.6 - 8.3 10e3/uL    Absolute Lymphocytes 2.7 0.8 - 5.3 10e3/uL    Absolute Monocytes 0.9 0.0 - 1.3 10e3/uL    Absolute Eosinophils 0.2 0.0 - 0.7 10e3/uL    Absolute Basophils 0.1 0.0 - 0.2 10e3/uL    Absolute Immature Granulocytes 0.0 <=0.4 10e3/uL    Absolute NRBCs 0.0 10e3/uL       Medications - No data to display    Assessments & Plan (with Medical Decision Making)  62-year-old male with suspected penile fracture now nearly 72 hours out upon presentation.  Discussed the case in consultation with urologist, Dr. Cid at the [facility].  He has accepted the patient tonight for expected surgery tonight.       I have reviewed the nursing notes.    I have reviewed the findings, diagnosis, plan and need for follow up with the patient.       New Prescriptions    No medications on file       Final diagnoses:   Fracture of corpus cavernosum penis, initial encounter       4/11/2022   [facility] EMERGENCY DEPT     Costa Castrejon MD  04/11/22 2200       Costa Castrejon MD  04/11/22 2200

## 2022-04-12 NOTE — OR NURSING
PACU to Inpatient Nursing Handoff    Patient Jf Díaz is a 62 year old male who speaks English.   Procedure Procedure(s):  CYSTOSCOPY, FLEXIBLE, and Penile Exploration  Cystoscopy and Penile Exploration   Surgeon(s) Primary: Kyaw Cid MD  Resident - Assisting: Lakeisha Armendariz MD     Allergies   Allergen Reactions     Codeine Hives, Nausea and Itching       Isolation  [unfilled]     Past Medical History   has no past medical history on file.    Anesthesia General   Dermatome Level     Preop Meds Not applicable   Nerve block Not applicable   Intraop Meds OR medications are as follows:     midazolam 1mg/mL (mg)  Total dose:  2 mg    Date/Time Rate/Dose/Volume Action Route Admin User Audit    04/12/22 0248 2 mg Given Intravenous Hartmann, KAEL Ny CRNA edited        fentaNYL (SUBLIMAZE) injection (mcg)  Total dose:  100 mcg    Date/Time Rate/Dose/Volume Action Route Admin User Audit    04/12/22 0248 50 mcg Given Intravenous Hartmann, KAEL Ny CRNA     0400 50 mcg Given Intravenous Hartmann, KAEL Ny CRNA       lidocaine 2% (mg)  Total dose:  100 mg    Date/Time Rate/Dose/Volume Action Route Admin User Audit    04/12/22 0248 100 mg Given Intravenous Hartmann, KAEL Ny CRNA edited      propofol (DIPRIVAN) injection 10 mg/mL vial (mg)  Total dose:  350 mg    Date/Time Rate/Dose/Volume Action Route Admin User Audit    04/12/22 0248 300 mg Given Intravenous Hartmann, KAEL Ny CRNA edited    0255 50 mg Given Intravenous Hartmann, KAEL Ny CRNA       rocuronium 10mg/mL (mg)  Total dose:  40 mg    Date/Time Rate/Dose/Volume Action Route Admin User Audit    04/12/22 0248 40 mg Given Intravenous Hartmann, KAEL Ny CRNA edited      ondansetron 2mg/mL (mg)  Total dose:  4 mg    Date/Time Rate/Dose/Volume Action Route Admin User Audit    04/12/22 0345 4 mg Given Intravenous Hartmann, KAEL Ny CRNA       phenylephrine (ELLIE-SYNEPHRINE) injection (mcg)  Total  dose:  800 mcg    Date/Time Rate/Dose/Volume Action Route Admin User Audit    04/12/22 0301 100 mcg New Bag Intravenous HartmannMissy APRN CRNA     0304 200 mcg Bolus Intravenous Hartmann, KAEL Ny CRNA     0310 200 mcg Bolus Intravenous Hartmann, KAEL Ny CRNA     0321 100 mcg Bolus Intravenous Hartmann, KAEL Ny CRNA     0325 100 mcg Bolus Intravenous Hartmann, KAEL Ny CRNA     0330 100 mcg Bolus Intravenous Hartmann, KAEL Ny CRNA       sugammadex 200 mg/2ml (mg)  Total dose:  200 mg    Date/Time Rate/Dose/Volume Action Route Admin User Audit    04/12/22 0418 200 mg Given Intravenous Missy Hartmann APRN CRNA       ceFAZolin vial 1 gm (g)  Total dose:  3 g    Date/Time Rate/Dose/Volume Action Route Admin User Audit    04/12/22 0305 3 g (over 5 min) Given Intravenous Missy Hartmann APRN CRNA       phenylephrine 0.1 mg/mL infusion (mcg/kg/min) (mcg/kg/min)  Total dose:  4.18 mg Dosing weight:  128.7    Date/Time Rate/Dose/Volume Action Route Admin User Audit    04/12/22 0310 0.5 mcg/kg/min - 38.61 mL/hr New Bag Intravenous Missy Hartmann APRN CRNA     0415  Stopped Intravenous HartmannMissy leal APRN CRNA       lactated ringers infusion (mL)  Total volume:  1,200 mL    Date/Time Rate/Dose/Volume Action Route Admin User Audit    04/12/22 0238  New Bag Intravenous HartmannMissy leal APRN CRNA edited    0415 1,000 mL New Bag Intravenous HartmannMissy leal APRN CRNA     0428 200 mL Anesthesia Volume Adjustment Intravenous Missy Hartmann APRN CRNA          Local Meds Yes   Antibiotics cefazolin (Ancef) - last given at 0305     Pain Patient Currently in Pain: denies   PACU meds  Not applicable   PCA / epidural No   Capnography Respiratory Monitoring (EtCO2): 42 mmHg   Telemetry ECG Rhythm: Sinus rhythm   Inpatient Telemetry Monitor Ordered? No        Labs Glucose Lab Results   Component Value Date     04/11/2022       Hgb Lab Results   Component Value Date     HGB 15.7 04/11/2022       INR No results found for: INR   PACU Imaging Not applicable     Wound/Incision Incision/Surgical Site 04/12/22 Penis (Active)   Incision Assessment [facility] 04/12/22 0352   Closure Sutures 04/12/22 0352   Dressing Intervention Clean, dry, intact 04/12/22 0352   Number of days: 0      CMS        Equipment athletic supporter provided by OR for pt Post-op, will send with PT to the floor   Other LDA ETT Cuffed Single 7.5 mm (Active)   Number of days: 0        IV Access Peripheral IV 04/11/22 Right;Anterior Upper forearm (Active)   Site Assessment [facility] 04/12/22 0425   Line Status Infusing 04/12/22 0425   Phlebitis Scale 0-->no symptoms 04/12/22 0425   Infiltration Scale 0 04/12/22 0425   Number of days: 1      Blood Products Not applicable EBL 5 mL   Intake/Output    Drains / Davis     Time of void PreOp Void Prior to Procedure: 2320 (04/12/22 0015)    PostOp      Diapered? No   Bladder Scan     PO    tolerating sips     Vitals    B/P: (!) 145/106  T: 98.8  F (37.1  C)    Temp src: Oral  P:  Pulse: 72 (04/12/22 0454)          R: 17  O2:  SpO2: 94 %         Oxygen Delivery: 2 LPM (04/12/22 0500)         Family/support present pt drove self to the hospital, mother is in the area, spouse is by the [facility].    Patient belongings  one belongings bag with clothes, shoes, cell phone and glasses   Patient transported on cart and air mat   DC meds/scripts (obs/outpt) Not applicable   Inpatient Pain Meds Released? Yes       Special needs/considerations pt has athletic supporter provided by OR staff   Tasks needing completion place athletic support       Aislinn Davenport RN  [facility] [id number]

## 2022-04-12 NOTE — ANESTHESIA CARE TRANSFER NOTE
Patient: Jf Díaz    Procedure: Procedure(s):  CYSTOSCOPY, FLEXIBLE, and Penile Exploration  Cystoscopy and Penile Exploration       Diagnosis: Penile fracture [S39.840A]  Diagnosis Additional Information: No value filed.    Anesthesia Type:   General     Note:    Oropharynx: oropharynx clear of all foreign objects  Level of Consciousness: drowsy  Oxygen Supplementation: face mask  Level of Supplemental Oxygen (L/min / FiO2): 6  Independent Airway: airway patency satisfactory and stable    Vital Signs Stable: post-procedure vital signs reviewed and stable  Report to RN Given: handoff report given  Patient transferred to: PACU    Handoff Report: Identifed the Patient, Identified the Reponsible Provider, Reviewed the pertinent medical history, Discussed the surgical course, Reviewed Intra-OP anesthesia mangement and issues during anesthesia, Set expectations for post-procedure period and Allowed opportunity for questions and acknowledgement of understanding      Vitals:  Vitals Value Taken Time   /96 04/12/22 0424   Temp     Pulse 79 04/12/22 0427   Resp 12 04/12/22 0427   SpO2 95 % 04/12/22 0427   Vitals shown include unvalidated device data.    Electronically Signed By: KAEL Flores CRNA  April 12, 2022  4:28 AM

## 2022-04-12 NOTE — ANESTHESIA PROCEDURE NOTES
Airway       Patient location during procedure: OR       Procedure Start/Stop Times: 4/12/2022 2:51 AM  Staff -        CRNA: Missy Hartmann APRN CRNA       Performed By: CRNA  Consent for Airway        Urgency: elective  Indications and Patient Condition       Indications for airway management: elkin-procedural       Induction type:intravenous       Mask difficulty assessment: 1 - vent by mask    Final Airway Details       Final airway type: endotracheal airway       Successful airway: ETT - single  Endotracheal Airway Details        ETT size (mm): 7.5       Cuffed: yes       Successful intubation technique: video laryngoscopy       VL Blade Size: MAC 4       Grade View of Cords: 1       Adjucts: stylet       Position: Right       Measured from: lips       Secured at (cm): 23       Bite block used: None    Post intubation assessment        Placement verified by: capnometry, equal breath sounds and chest rise        Number of attempts at approach: 1       Number of other approaches attempted: 0       Secured with: silk tape       Ease of procedure: easy       Dentition: Intact and Unchanged    Medication(s) Administered   Medication Administration Time: 4/12/2022 2:51 AM

## 2022-04-12 NOTE — OP NOTE
OPERATIVE REPORT  4/12/2022    PREOPERATIVE DIAGNOSIS:    1. Penile fracture    POSTOPERATIVE DIAGNOSIS:   1. Thrombosis of dorsal penile vein    PROCEDURES PERFORMED:   1. Flexible cystoscopy  2. Penile exploration    STAFF SURGEON: Kyaw Cid MD    RESIDENT(S):  Don Yap MD; aLkeisha Armednariz MD    ANESTHESIA: General    ESTIMATED BLOOD LOSS: 5 mL.     DRAINS: None    SIGNIFICANT FINDINGS:  1. Negative cystoscopy (no urethral injury)  2. No penile fracture  3. Thrombosed left dorsal penile vein at the midshaft    SPECIMEN(S): None    OPERATIVE INDICATIONS:   Jf Díaz is a(n) 62 year old male who suffered a blunt penile trauma during sexual intercourse with history and physical exam findings concerning for penile fracture. The patient was counseled on the alternatives, risks, and benefits and elected to proceed with the above stated procedure.    DESCRIPTION OF PROCEDURE:   After verification of informed consent was obtained, the patient was brought to the operating room, and moved to the operating table. After adequate anesthesia was induced, the patient was repositioned in the supine position and prepped and draped in the usual sterile fashion. A formal timeout was performed to confirm the correct patient, procedure and operative site.     We initiated the procedure by performing flexible cystoscopy with a 16 [place]ian flexible cystoscope into well-lubricated urethra.  The anterior urethra was unremarkable with no evidence of urethral injury.  Similarly the posterior urethra was also unremarkable.  There was no significant prostatic hypertrophy.  The bladder was grossly free of any tumors, stones, or diverticuli.  The media was clear.  The scope was then backed out and the urethra was reexamined and confirmed to be free of any injury.  The cystoscope was then removed.    We marked out a circumferential incision approximately 1 cm proximal to the coronal margin.  We incised this with a #15  blade scalpel through subcutaneous tissue and into the dartos layers.  We carried our dissection down inferiorly until our incision was through the dartos fascia circumferentially.  We then used blunt dissection to degloved the entire penile shaft.  On our physical exam prior to our operation we had noted an area of induration located on the dorsal left aspect of the midshaft therefore we focused our initial dissection in this area.  After the penis was degloved we did note evidence of a thrombosed dorsal left penile vein located at the mid shaft of the penis where the aforementioned induration had been noted.  With a combination of sharp and electrocautery dissection we carried the dissection down through the level of Ibarra's fascia at the 3 o'clock position of the left lateral aspect of the penile shaft approximately two thirds the way down to avoid the left neurovascular bundle.  Once we dissected down to the level of the tunica albuginea at this location, we carried our dissection proximally towards the base of the penis along the left lateral aspect of the shaft elevating Ibarra's fascia and with it the left neurovascular bundle off of the tunica.  We passed a right angle through this area of dissection at the midshaft to the 12 o'clock position Ibarra's fascia and made a window.  We passed a vessel loop through this window and used this to further retract Ibarra's fascia and the left neurovascular bundle off of the tunica.  We carried our dissection with combination of both blunt and sharp dissection to elevate the remainder of Ibarra's fascia overlying the area of the thrombosed dorsal left penile vein.  At this point we performed an artificial erection using injectable saline and hypodermic needle at the 3 o'clock position of the midshaft.  We placed a tourniquet at the base of the penis with a Penrose drain to assist with the artificial erection.  The artificial erection revealed no extravasation of saline, no  defect located at the area of the thrombosed dorsal vein, nor any other palpable or visual defects along the entirety of the shaft of the penis.  Having ruled out the presence of a penile fracture we then proceeded to close Ibarra's fascia with 3-0 Vicryl suture in a simple running fashion. We used interrupted 4-0 Monocryl sutures at the 12, 6, 3, and 9 o'clock positions to close the circumcising incision by reapproximating the proximal penile shaft skin and dartos back to the distal shaft skin.  The remaining defects within the circumcising incision were closed with 4-0 Monocryl in simple running fashion in quadrants.  The patient's bladder was drained by passing a well-lubricated 16 Portuguese Davis catheter via the urethra and into the bladder with return of approximately 800 mL of clear yellow urine.  The Davis catheter was then removed.  Triple antibiotic ointment was applied around the incision.  The penis was then wrapped with gauze dressing followed by a Coban dressing.    The procedure was then concluded. The patient tolerated the procedure well. There were no immediate complications. He was awoken from anesthesia, extubated, and transferred to the postanesthesia care unit in stable condition.    POSTOP PLAN:  1. Admit to Urology overnight  2. Likely discharge home in the morning (if patient can arrange a ride)  3. Penile dressing may be taken down in 48 hours  4. Scrotal support was provided to decrease penile and scrotal edema & bruising      Don Yap MD  Urology, PGY-4  (p) [phone]

## 2022-04-12 NOTE — BRIEF OP NOTE
[facility]    Brief Operative Note    Pre-operative diagnosis: Penile fracture [S39.840A]  Post-operative diagnosis Thrombosed dorsal vein    Procedure: Procedure(s):  CYSTOSCOPY, FLEXIBLE, and Penile Exploration  Cystoscopy and Penile Exploration  Surgeon: Surgeon(s) and Role:     * Kyaw Cid MD - Primary     * Lakeisha Armendariz MD - Resident - Assisting     * Don Yap MD  Anesthesia: General   Estimated Blood Loss: 5 ccs    Drains: None  Specimens: * No specimens in log *  Findings:   see operative report. Thrombosed dorsal vein.  Complications: None.  Implants: * No implants in log *

## 2022-04-12 NOTE — ANESTHESIA PREPROCEDURE EVALUATION
Anesthesia Pre-Procedure Evaluation    Patient: Jf Díaz   MRN: [id number] : 1960        Procedure : Procedure(s):  CYSTOSCOPY, FLEXIBLE,  EXPLORATION, PENILE          No past medical history on file.   No past surgical history on file.   Allergies   Allergen Reactions     Codeine Hives, Nausea and Itching      Social History     Tobacco Use     Smoking status: Never Smoker     Smokeless tobacco: Never Used   Substance Use Topics     Alcohol use: Not on file      Wt Readings from Last 1 Encounters:   20 119.3 kg (263 lb)        Anesthesia Evaluation            ROS/MED HX  ENT/Pulmonary:  - neg pulmonary ROS     Neurologic:  - neg neurologic ROS     Cardiovascular:  - neg cardiovascular ROS     METS/Exercise Tolerance:     Hematologic:  - neg hematologic  ROS     Musculoskeletal:  - neg musculoskeletal ROS     GI/Hepatic:  - neg GI/hepatic ROS     Renal/Genitourinary:       Endo:  - neg endo ROS     Psychiatric/Substance Use:  - neg psychiatric ROS     Infectious Disease:  - neg infectious disease ROS     Malignancy:  - neg malignancy ROS     Other:            Physical Exam    Airway        Mallampati: II   TM distance: > 3 FB   Neck ROM: full   Mouth opening: > 3 cm    Respiratory Devices and Support         Dental  no notable dental history         Cardiovascular   cardiovascular exam normal          Pulmonary   pulmonary exam normal                OUTSIDE LABS:  CBC:   Lab Results   Component Value Date    WBC 8.1 2022    HGB 15.7 2022    HCT 44.2 2022     2022     BMP:   Lab Results   Component Value Date     2022    POTASSIUM 4.0 2022    CHLORIDE 107 2022    CO2 30 2022    BUN 17 2022    CR 1.23 2022     (H) 2022     COAGS: No results found for: PTT, INR, FIBR  POC: No results found for: BGM, HCG, HCGS  HEPATIC: No results found for: ALBUMIN, PROTTOTAL, ALT, AST, GGT, ALKPHOS, BILITOTAL, BILIDIRECT,  HOMAR  OTHER:   Lab Results   Component Value Date    MARYANN 9.2 04/11/2022       Anesthesia Plan    ASA Status:  2, emergent    NPO Status:  Will be NPO Appropriate at ... 4/12/2022 2:30 AM   Anesthesia Type: General.     - Airway: ETT   Induction: Intravenous, RSI.   Maintenance: Balanced.        Consents    Anesthesia Plan(s) and associated risks, benefits, and realistic alternatives discussed. Questions answered and patient/representative(s) expressed understanding.    - Discussed:     - Discussed with:  Patient      - Extended Intubation/Ventilatory Support Discussed: No.      - Patient is DNR/DNI Status: No    Use of blood products discussed: No .     Postoperative Care       PONV prophylaxis: Ondansetron (or other 5HT-3)     Comments:    Other Comments: Emergency penile fracture for reduction, Full stomach, wait for NPO status            Clara Rivera MD

## 2022-04-12 NOTE — PLAN OF CARE
Temp: 98.1  F (36.7  C) Temp src: Oral BP: (!) 158/84 Pulse: 70   Resp: 18 SpO2: 92 % O2 Device: Nasal cannula Oxygen Delivery: 1 LPM     Pt arrived to  Med Surg around 0545. Pt oriented to room. Belongings remain with pt.     AOx4. Denies CP & SOB. Baseline N/T to fingers and RLE. SBA with gait belt to BR. Voiding without difficulty. LBM 4/11/22, prior to surgery. Pt c/o mild headache; managed with tylenol. R PIV SL. Call light within reach and pt able ot make needs known. Continue with POC.

## 2022-04-12 NOTE — PLAN OF CARE
Pt discharged home @ 14:25 with belongings. Transported home by mother (Meri), wife to help with care at home. IV access removed. Discharge education and contact numbers provided. No follow up necessary per urology team. Discharge meds given. Discharge education complete.

## 2022-04-12 NOTE — CONSULTS
"Urology Consult History and Physical    Name: Jf Díaz    MRN: [id number]   YOB: 1960               Chief Complaint:   Penile fracture  History is obtained from patient, chart review          History of Present Illness:   Jf Díaz is a 62 year old male with no PMHx who presented to [facility] ED 4/11 with penile pain, swelling, and bruising after traumatic injury 4/8. Patient reported penis bending during intercourse. He is not sure which direction the penis bent. He did NOT have immediate detumescence or hear a \"pop\", states he thought it may be friction at that time. Hours later he noted significant swelling and pain. He has not had an erection since the injury. Endorses pain at the left side. He has been urinating without any complaints since the time of injury.    Patient has been dealing with a sick pet at home which delayed him seeking care. He states that his wife encouraged him to be seen.          Past Medical History:   None         Past Surgical History:   Hernia repair  Orthopedic surgeries         Social History:     Social History     Tobacco Use     Smoking status: Never Smoker     Smokeless tobacco: Never Used   Substance Use Topics     Alcohol use: Not on file   . Has been taking care of dog with cancer. Has horses, chickens, dogs, cats at home         Family History:   History reviewed. No pertinent family history.         Allergies:     Allergies   Allergen Reactions     Codeine Hives, Nausea and Itching          Medications:     Current Facility-Administered Medications   Medication     lactated ringers infusion     lidocaine 1 % 0.1-1 mL     sodium chloride (PF) 0.9% PF flush 3 mL     sodium chloride (PF) 0.9% PF flush 3 mL             Review of Systems:    ROS: See HPI for pertinent details.  Remainder of 10-point ROS negative.         Physical Exam:   VS:  T: Data Unavailable    HR: Data Unavailable    BP: Data Unavailable    RR: Data Unavailable "   GEN:  AOx3.  NAD.  Pleasant.  HEENT:  Sclerae anicteric.  Conjunctivae pink.  Moist mucous membranes  NECK:  Supple.  No lymphadenopathy.  CV:  RRR  LUNGS: Non-labored breathing.  ABD:  Soft.  NT.  ND.  No rebound or guarding.  No surgical scars. No masses.    :  Phallus uncircumcised.  Significant preputial ecchymosis, mild edema. Glans appears normal. Meatus patent. Bruising extends towards scrotum. Especially tender to palpation at 3:00 of glans  EXT:  Warm, well perfused.    SKIN:  Warm.  Dry.  No rashes.  NEURO:  CN grossly intact.            Data:   All laboratory data reviewed:    No results found for: PSA  Recent Labs   Lab 04/11/22  2153   WBC 8.1   HGB 15.7          Recent Labs   Lab 04/11/22  2153      POTASSIUM 4.0   CHLORIDE 107   CO2 30   BUN 17   CR 1.23   *   MARYANN 9.2     COVID pending         Impression and Plan:   Impression / Plan:   Jf Díaz is a 62 year old male with clinical picture consistent with penile fracture. We discussed the pathophysiology of penile fracture and associated risks including erectile dysfunction, penile curvature, penile plaques/nodules, and painful erections. We discussed that he is at risk for these outcomes with or without intervention, however penile exploration to evacuate hematoma and repair tunica injury is associated with lower rates. We discussed operative risks including infection, bleeding, damage to penile structures and/or urethra.       - to OR for emergent penile exploration, flexible cystoscopy. Informed consent obtained  - admit to urology obs  - pain control  - ADAT after OR    Discussed with chief resident Dr Yap and staff Dr. Palak Armendariz MD  PGY3  [id number]

## 2022-04-12 NOTE — PROGRESS NOTES
"Urology  Progress Note    - Penile exploration overnight, no fracture seen  - Doing well this AM, pain well controlled    Exam  /83 (BP Location: Left arm)   Pulse 69   Temp 98.1  F (36.7  C) (Oral)   Resp 20   Ht 1.854 m (6' 1\")   Wt 128.7 kg (283 lb 11.7 oz)   SpO2 96%   BMI 37.43 kg/m    No acute distress  Non-labored breathing on RA  Penis w/ dressing intact, ecchymoses and edema distal to dressing, glans well perfused     since midnight    Labs  AM labs pending    Assessment/Plan  62 year old y/o male POD#1 s/p penile exploration to rule out penile fracture; no fracture found.     Neuro: Tylenol, oxy, dilaudid for pain control  CV: LIDIA  Pulm: incentive spirometry while awake  FEN/GI: Regular diet, senokot/miralax  Endo: LIDIA  : Penile wrapping 48 hrs post op.  Heme: LIDIA  ID: No concerns for infection  Activity: Up ad rochelle, no heavy lifting 2 wks  PPx: SCDs  Dispo: Home this AM  Will discuss with Dr. Cid.      Danny Villa MD  Urology PGY-4      Contacting the Urology Team     Please use the following job codes to reach the Urology Team. Note that you must use an in house phone and that job codes cannot receive text pages.     On weekdays, dial [phone] (or star-star-star [phone] on the new [employer] telephones) then [phone] to reach the Adult Urology resident or PA on call    On weekdays, dial [phone] (or star-star-star 777 on the new [employer] telephones) then [phone] to reach the Pediatric Urology resident    On weeknights and weekends, dial [phone] (or star-star-star 777 on the new [employer] telephones) then [phone] to reach the Urology resident on call (for both Adult and Pediatrics)           "

## 2022-04-13 NOTE — DISCHARGE SUMMARY
Discharge Summary     Jf Díaz MRN# [id number]   YOB: 1960 Age: 62 year old     Date of Admission:  4/11/2022  Date of Discharge::  4/12/2022  2:34 PM  Admitting Physician:  Kyaw Cid MD  Discharge Physician:  Danny Villa MD  Primary Care Physician:         No Ref-Primary, Physician          Admission Diagnoses:   Possible penile fracture [S39.840A]          Discharge Diagnosis:   Superficial dorsal penile vein thrombosis         Procedures:   4/12/22: Procedure(s):  CYSTOSCOPY, FLEXIBLE, and Penile Exploration  Cystoscopy and Penile Exploration        Non-operative procedures:   None performed          Consultations:   None         Imaging Studies:     Results for orders placed or performed during the hospital encounter of 03/22/20   CT Thoracic Spine w/o Contrast    Narrative    CT OF THE THORACIC SPINE WITHOUT CONTRAST   3/22/2020 8:05 PM     COMPARISON: None    HISTORY: Mid-back/T-spine pain, initial exam; fell on mid back, T8  spinous process severe pain     TECHNIQUE: Axial CT images of the thoracic spine were acquired without  intravenous contrast. Multiplanar reformations were created from the  axial source images.      FINDINGS:  There is mild right convex curvature of the thoracic spine  centered at the T8 level; alignment of the thoracic vertebrae is  otherwise normal. Vertebral body heights of the thoracic spine are  normal. There are flowing right anterolateral syndesmophytes from T4  down to L1 suggesting DISH. There are no fractures of the thoracic  spine. There is no spinal canal stenosis of the thoracic spine.      Impression    IMPRESSION: No evidence for fracture or traumatic malalignment of the  thoracic spine. No spinal canal stenosis of the thoracic spine.      Radiation dose for this scan was reduced using automated exposure  control, adjustment of the mA and/or kV according to patient size, or  iterative reconstruction  technique    BETZAIDA IRIZARRY MD   XR Chest 2 Views    Narrative    XR CHEST 2 VW   3/22/2020 9:02 PM     HISTORY: posterior chest discomfort worse with deep breath, fall    COMPARISON: None.      Impression    IMPRESSION: No acute cardiopulmonary disease.    TINO RIVERA MD            Medications Prior to Admission:     No medications prior to admission.            Discharge Medications:     Discharge Medication List as of 4/12/2022  1:35 PM      START taking these medications    Details   acetaminophen (TYLENOL) 325 MG tablet Take 1-2 tablets (325-650 mg) by mouth every 6 hours as needed for mild pain, Disp-112 tablet, R-0, E-Prescribe         CONTINUE these medications which have NOT CHANGED    Details   cyclobenzaprine (FLEXERIL) 10 MG tablet Take 1 tablet (10 mg) by mouth 3 times daily as needed for muscle spasms, Disp-15 tablet,R-0, E-Prescribe      ibuprofen (ADVIL/MOTRIN) 200 MG tablet Take 400 mg by mouth every 4 hours as needed for mild pain, Historical      oxyCODONE (ROXICODONE) 5 MG tablet Take 1-2 tablets (5-10 mg) by mouth every 6 hours as needed for severe pain, Disp-12 tablet,R-0, Local Print                    Brief History of Illness:   Reason for admission requiring a surgical or invasive procedure:   Penile fracture [S39.840A]   The patient underwent the following procedure(s):   See above   There were no immediate complications during this procedure.    Please refer to the full operative summary for details.           Hospital Course:   Intraop there was no finding of penile fracture, only a thrombosed dorsal penile vein. He was admitted for observation overnight post-operatively. The patient's hospital course was unremarkable.  Jf Díaz recovered as anticipated and experienced no post-operative complications.     On POD#0 patient was ambulating without assitance, tolerating the discharge diet, had pain controlled with PO medications to go home with, and requiring no IV medications  or fluids. Patient was discharged home with appropriate contact information, follow-up and instructions as seen below in the discharge paperwork.         Final Pathology Result:   Pending at time of discharge         Discharge Instructions and Follow-Up:     Discharge Procedure Orders   When to call - Contact Surgeon Team   Order Comments: You may experience symptoms that require follow-up before your scheduled appointment. Contact your Surgeon Team if you are concerned about pain control, large amount of bleeding, blood clots, constipation, or if you experience signs of infection (fever, growing tenderness at the surgery site, a large amount of drainage, severe pain, foul-smelling drainage, redness or swelling.     When to call - Reach out to Urgent Care   Order Comments: If you are experiencing uncontrolled Nausea and Vomiting, uncontrolled pain, inability to urinate and uncomfortable, and in need of immediate care, and you are NOT able to reach your Surgeon Team, go to an Urgent Care clinic. Do NOT go to the Emergency Room unless you have shortness of breath, chest pain, or other signs of a medical emergency.     Symptoms - Fever Management   Order Comments: A low grade fever can be expected after surgery. Your Provider many have prescribed an Opioid pain medication that also contains acetaminophen (TYLENOL) that may help with Fever management.  Do NOT take additional acetaminophen (TYLENOL) in combination with an Opioid/acetaminophen (TYLENOL) product. Read the labels on your Over The Counter (OTC) medications with care.     Symptoms - Reduced Urine Output   Order Comments: If it has been greater than 8 hours since you have urinated despite drinking plenty of water, call your Surgeon Team.     No driving or operating machinery   Order Comments: Do NOT drive any vehicle or operate mechanical equipment for 24 hours following the end of your surgery.  Even though you may feel normal, your reactions may be affected  by Anesthesia medication you received.     No Alcohol   Order Comments: Do NOT drink alcoholic beverages for 24 hours following your surgery and while taking pain medications.     Diet Instructions   Order Comments: Follow your surgeon's orders for any diet restrictions.  If you did not receive any diet restrictions, you may drink clear liquids (apple juice, ginger ale, 7-up, broth, etc.), and progress to your regular diet as you feel able. It is important to stay well-hydrated after surgery and drink plenty of water.     Dressing / Wound Care - Wound   Order Comments: You have a clean dressing on your surgical wound.   Contact your Surgeon Team if you have increased redness, warmth around the surgical wound, and/or drainage from the surgical wound.     Discharge Instructions - Comfort and Pain Management   Order Comments: Pain after surgery is normal and expected. You will have some amount of pain after surgery. Your pain will improve with time. There are several things you can do to help reduce your pain including: rest, ice, and using pain medications as needed. Use pain interventions and don't wait until pain level is out of control. Contact your Surgeon Team if you have pain that persists or worsens after surgery despite rest, ice, and taking your medication(s) as prescribed. You may have a dry mouth, a sore throat, muscles aches or trouble sleeping, and these symptoms should go away after 24 hours.     Discharge Instructions - Rest   Order Comments: Rest and relax for the next 24 hours. Make arrangements to have someone stay with you overnight, and avoid hazardous and strenuous activities.  Do NOT make any important decisions for the next 24 hours.     Shower/Bathing - Restrictions (specify)   Order Comments: Shower/Bathing - Restrictions (specify)   Do not soak or submerge in a pool or tub for 2 weeks     Order Specific Question Answer Comments   Is discharge order? Yes      Return to normal activity as  "tolerated   Order Comments: Return to normal activity as tolerated     Order Specific Question Answer Comments   Is discharge order? Yes      May return to work (WITHOUT restrictions)   Order Comments: May return to work in 1 week(s) with NO restrictions.     Order Specific Question Answer Comments   Is discharge order? Yes      When to call - Reasons to Call 911   Order Comments: Call 911 immediately if you experience sudden-onset chest pain, arm weakness/numbness, slurred speech, or shortness of breath    You're welcome to see your primary care doctor or a urologist closer to home for follow up and for wound check.  If you would like to be seen in our clinic (in person or via virtual visit) please call the number below to schedule an appointment with Dr. Cid.    Phone numbers:   - Monday through Friday 8am to 4:30pm: Call [phone] with questions, requests for medication refills, or to schedule or confirm an appointment.  - Nights or weekends: call the after hours emergency pager - [phone] and tell the  \"I would like to page the Urology Resident on call.\" Please note, due to prescribing laws, resident physicians are unable to prescribe narcotics after-hours. If you feel as though you will need a refill of a narcotic pain medication, you will need to call the clinic during business hours OR seek emergency care.  - For emergencies, call 911            Discharge Disposition:     Discharged to Home      Condition at discharge: Good    --    Danny Villa MD  Urology PGY-4    9:18 PM, 4/12/2022   "

## 2022-04-14 ENCOUNTER — TELEPHONE (OUTPATIENT)
Dept: UROLOGY | Facility: CLINIC | Age: 62
End: 2022-04-14
Payer: COMMERCIAL

## 2022-04-18 NOTE — TELEPHONE ENCOUNTER
[facility] Call Center    Phone Message    May a detailed message be left on voicemail: yes     Reason for Call: Other: Jf liz Darlin's call to schedule a post op appt.  He is able to make that date and time.  Please schedule the appointment.  Thank you!  (encounter sent per guidelines for post op)     Action Taken: Message routed to:  [facility] ([facility] Urology    Travel Screening: Not Applicable                                                                       18-Jun-2017

## 2022-04-19 ENCOUNTER — PRE VISIT (OUTPATIENT)
Dept: UROLOGY | Facility: CLINIC | Age: 62
End: 2022-04-19
Payer: COMMERCIAL

## 2022-04-19 NOTE — CONFIDENTIAL NOTE
Reason for visit: post-op cystoscopy penile exploration    Relevant information: penile fracture, thrombosis of dorsal penile vein    Records/imaging/labs/orders: in epic    Pt called: n/a    At Rooming: nubia Robert  4/19/2022  12:04 PM

## 2022-04-22 ENCOUNTER — NURSE TRIAGE (OUTPATIENT)
Dept: NURSING | Facility: CLINIC | Age: 62
End: 2022-04-22

## 2022-04-22 DIAGNOSIS — T81.49XA INCISIONAL INFECTION: Primary | ICD-10-CM

## 2022-04-22 RX ORDER — CEPHALEXIN 500 MG/1
500 CAPSULE ORAL 4 TIMES DAILY
Qty: 20 CAPSULE | Refills: 0 | Status: SHIPPED | OUTPATIENT
Start: 2022-04-22 | End: 2022-04-27

## 2022-04-22 NOTE — TELEPHONE ENCOUNTER
Per Dr. Cid, will treat with Keflex and bacitracin.  Called and explained to patient.  He states understanding. Encouraged to push fluids. Watch for any fever or if worsens, contact us.  EARLINE CHRIS

## 2022-04-22 NOTE — TELEPHONE ENCOUNTER
Nurse Triage SBAR    Is this a 2nd Level Triage?   Yes    Background: CYSTOSCOPY, FLEXIBLE, and Penile Exploration  And Cystoscopy and Penile Exploration    Assessment:   Pt reporting right underneath the incision line.   There is some sloughing and some yellow pus colored area by the incision site.   Also some redness and swelling.     No fever symptoms noted.   Pt wanting to call before the weekend to report this and see if there was anything he needed to do.   Would like a call back from the clinic.       Protocol Recommended Disposition:   See Today Or Tomorrow In Office    Recommendation:    Please call Pt back @ [phone] for further assistance.    Please review and call Pt back for further assistance.     Juli Telles RN  Central Triage Red Flags/Med Refills    Reason for Disposition    Patient wants to be seen    Additional Information    Negative: Major abdominal surgical incision and wound gaping open with visible internal organs    Negative: Sounds like a life-threatening emergency to the triager    Negative: Bleeding from incision and won't stop after 10 minutes of direct pressure    Negative: Widespread rash and bright red, sunburn-like    Negative: Severe pain in the incision    Negative: Incision gaping open and < 2 days (48 hours) since wound re-opened    Negative: Incision gaping open and length of opening > 2 inches (5 cm)    Negative: Patient sounds very sick or weak to the triager    Negative: Sounds like a serious complication to the triager    Negative: Fever > 100.4 F (38.0 C)    Negative: Incision looks infected (spreading redness, pain)    Negative: Red streak runs from the incision and longer than 1 inch (2.5 cm)    Negative: Pus or bad-smelling fluid draining from incision    Negative: Dressing soaked with blood or body fluid (e.g., drainage)    Negative: Raised bruise and size > 2 inches (5 cm) and expanding    Negative: Caller has URGENT question and triager unable to answer  question    Negative: Incision gaping open and length of opening > 1/4 inch (6 mm) and on the face and over 2 days since wound re-opened    Negative: Incision gaping open and length of opening > 1/2 inch (1 cm) and over 2 days since wound re-opened    Negative: Clear or blood-tinged fluid draining from wound and no fever    Negative: Suture or staple removal is overdue    Protocols used: POST-OP INCISION SYMPTOMS AND [id number]

## 2022-04-25 NOTE — PROGRESS NOTES
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 Negative cystoscopy (no urethral injury)  2. No penile fracture  3. 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 He noticed a purulent, foul smelling discharge from the incision site a few days ago and was started on Keflex. He is doing well otherwise. He has been having erections with no issues.          Past Medical History:   None          Past Surgical History:     Past Surgical History:   Procedure Laterality Date     CYSTOSCOPY FLEXIBLE N/A 4/11/2022    Procedure: CYSTOSCOPY, FLEXIBLE, and Penile Exploration;  Surgeon: Kyaw Cid MD;  Location: [facility] OR     EXPLORE SCROTUM N/A 4/11/2022    Procedure: Cystoscopy and Penile Exploration;  Surgeon: Kyaw Cid MD;  Location: [facility] OR     Hernia repair   Orthopedic surgeries          Medications     Current Outpatient Medications   Medication     acetaminophen (TYLENOL) 325 MG tablet     cephALEXin (KEFLEX) 500 MG capsule     cyclobenzaprine (FLEXERIL) 10 MG tablet     ibuprofen (ADVIL/MOTRIN) 200 MG tablet     oxyCODONE (ROXICODONE) 5 MG tablet     No current facility-administered medications for this visit.            Family History:   No family history on file.         Social History:     Social History     Socioeconomic History     Marital status:      Spouse name: Not on file     Number of children: Not on file     Years of education: Not on file     Highest education level: Not on file   Occupational History     Not on file   Tobacco Use     Smoking status: Never Smoker     Smokeless tobacco: Never Used   Substance and Sexual  Activity     Alcohol use: Not on file     Drug use: Not on file     Sexual activity: Not on file   Other Topics Concern     Not on file   Social History Narrative     Not on file     Social Determinants of Health     Financial Resource Strain: Not on file   Food Insecurity: Not on file   Transportation Needs: Not on file   Physical Activity: Not on file   Stress: Not on file   Social Connections: Not on file   Intimate Partner Violence: Not on file   Housing Stability: Not on file            Allergies:   Codeine         Review of Systems:  From intake questionnaire     Negative 14 system review except as noted on HPI, nurse's note.         Physical Exam:     Patient is a 62 year old  male    Vitals: There were no vitals taken for this visit. There is no height or weight on file to calculate BMI.  General Appearance Adult: Alert, no acute distress, oriented  HENT: throat/mouth:normal, good dentition  Lungs: no respiratory distress, or pursed lip breathing  Heart: No obvious jugular venous distension present  Abdomen: soft, nontender, no organomegaly or masses, scars noted  Lymphatics: No cervical or supraclavicular adenopathy  Musculoskeltal: extremities normal, no peripheral edema  Skin: no visible suspicious lesions or rashes  Neuro: Alert, oriented, speech and mentation normal  Psych: affect and mood normal  Gait: Normal  :  Penile inflammation and bruising has significantly improved. Still some fibrinoid tissue around the incision. Small wound dehiscence on the ventral side no sign of active infection         Labs and Pathology:    I reviewed all applicable laboratory and pathology data and went over findings with patient      Lab Results   Component Value Date    WBC 8.1 04/11/2022     Lab Results   Component Value Date    RBC 5.07 04/11/2022     Lab Results   Component Value Date    HGB 15.7 04/11/2022     Lab Results   Component Value Date    HCT 44.2 04/11/2022     No components found for: MCT  Lab Results    Component Value Date    MCV 87 04/11/2022     Lab Results   Component Value Date    MCH 31.0 04/11/2022     Lab Results   Component Value Date    MCHC 35.5 04/11/2022     Lab Results   Component Value Date    RDW 12.4 04/11/2022     Lab Results   Component Value Date     04/11/2022       Last Comprehensive Metabolic Panel:  Sodium   Date Value Ref Range Status   04/11/2022 141 133 - 144 mmol/L Final     Potassium   Date Value Ref Range Status   04/11/2022 4.0 3.4 - 5.3 mmol/L Final     Chloride   Date Value Ref Range Status   04/11/2022 107 94 - 109 mmol/L Final     Carbon Dioxide (CO2)   Date Value Ref Range Status   04/11/2022 30 20 - 32 mmol/L Final     Anion Gap   Date Value Ref Range Status   04/11/2022 4 3 - 14 mmol/L Final     Glucose   Date Value Ref Range Status   04/11/2022 106 (H) 70 - 99 mg/dL Final     Urea Nitrogen   Date Value Ref Range Status   04/11/2022 17 7 - 30 mg/dL Final     Creatinine   Date Value Ref Range Status   04/11/2022 1.23 0.66 - 1.25 mg/dL Final     GFR Estimate   Date Value Ref Range Status   04/11/2022 66 >60 mL/min/1.73m2 Final     Comment:     Effective December 21, 2021 eGFRcr in adults is calculated using the 2021 CKD-EPI creatinine equation which includes age and gender (Wilmer michel al., NEJ, DOI: 10.1056/GTLEwd1224239)     Calcium   Date Value Ref Range Status   04/11/2022 9.2 8.5 - 10.1 mg/dL Final       INR/Prothrombin Time    Creatinine   Date Value Ref Range Status   04/11/2022 1.23 0.66 - 1.25 mg/dL Final        No results found for: PSA        Imaging:    No pertinent imaging to review today            Assessment and Plan:     Assessment     62 year old male with history of thrombosed dorsal penile vein status post penile exploration with no evidence of penile or urethral injury with postoperative surgical site infection improving with antibiotics. Small wound dehiscence      Plan  Complete the course of antibiotics   Refrain from sexual activity for few more  weeks until the incision is completely healed   Return to clinic as needed    60 total minutes spent on the date of the encounter including direct interaction with the patient, performing chart review, documentation and further activities as noted above        Kyaw Cid MD   Department of Urology   [facility]

## 2022-04-27 ENCOUNTER — OFFICE VISIT (OUTPATIENT)
Dept: UROLOGY | Facility: CLINIC | Age: 62
End: 2022-04-27
Payer: COMMERCIAL

## 2022-04-27 VITALS
DIASTOLIC BLOOD PRESSURE: 84 MMHG | HEIGHT: 73 IN | HEART RATE: 86 BPM | SYSTOLIC BLOOD PRESSURE: 157 MMHG | WEIGHT: 280 LBS | BODY MASS INDEX: 37.11 KG/M2

## 2022-04-27 DIAGNOSIS — T14.90XA TRAUMATIC INJURY: Primary | ICD-10-CM

## 2022-04-27 PROCEDURE — 99024 POSTOP FOLLOW-UP VISIT: CPT | Performed by: UROLOGY

## 2022-04-27 ASSESSMENT — PAIN SCALES - GENERAL: PAINLEVEL: NO PAIN (1)

## 2022-04-27 NOTE — LETTER
4/27/2022       RE: Jf Díaz  [address]  [place] [address]     Dear Colleague,    Thank you for referring your patient, Jf Díaz, to the [facility] [place] at [facility]. Please see a copy of my visit note below.    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 Negative cystoscopy (no urethral injury)  2. No penile fracture  3. 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 He noticed a purulent, foul smelling discharge from the incision site a few days ago and was started on Keflex. He is doing well otherwise. He has been having erections with no issues.          Past Medical History:   None          Past Surgical History:     Past Surgical History:   Procedure Laterality Date     CYSTOSCOPY FLEXIBLE N/A 4/11/2022    Procedure: CYSTOSCOPY, FLEXIBLE, and Penile Exploration;  Surgeon: Kyaw Cid MD;  Location: [facility] OR     EXPLORE SCROTUM N/A 4/11/2022    Procedure: Cystoscopy and Penile Exploration;  Surgeon: Kyaw Cid MD;  Location: [facility] OR     Hernia repair   Orthopedic surgeries          Medications     Current Outpatient Medications   Medication     acetaminophen (TYLENOL) 325 MG tablet     cephALEXin (KEFLEX) 500 MG capsule     cyclobenzaprine (FLEXERIL) 10 MG tablet     ibuprofen (ADVIL/MOTRIN) 200 MG tablet     oxyCODONE (ROXICODONE) 5 MG tablet     No current facility-administered medications for this visit.            Family History:   No family history on file.         Social History:     Social History     Socioeconomic  History     Marital status:      Spouse name: Not on file     Number of children: Not on file     Years of education: Not on file     Highest education level: Not on file   Occupational History     Not on file   Tobacco Use     Smoking status: Never Smoker     Smokeless tobacco: Never Used   Substance and Sexual Activity     Alcohol use: Not on file     Drug use: Not on file     Sexual activity: Not on file   Other Topics Concern     Not on file   Social History Narrative     Not on file     Social Determinants of Health     Financial Resource Strain: Not on file   Food Insecurity: Not on file   Transportation Needs: Not on file   Physical Activity: Not on file   Stress: Not on file   Social Connections: Not on file   Intimate Partner Violence: Not on file   Housing Stability: Not on file            Allergies:   Codeine         Review of Systems:  From intake questionnaire     Negative 14 system review except as noted on HPI, nurse's note.         Physical Exam:     Patient is a 62 year old  male    Vitals: There were no vitals taken for this visit. There is no height or weight on file to calculate BMI.  General Appearance Adult: Alert, no acute distress, oriented  HENT: throat/mouth:normal, good dentition  Lungs: no respiratory distress, or pursed lip breathing  Heart: No obvious jugular venous distension present  Abdomen: soft, nontender, no organomegaly or masses, scars noted  Lymphatics: No cervical or supraclavicular adenopathy  Musculoskeltal: extremities normal, no peripheral edema  Skin: no visible suspicious lesions or rashes  Neuro: Alert, oriented, speech and mentation normal  Psych: affect and mood normal  Gait: Normal  :  Penile inflammation and bruising has significantly improved. Still some fibrinoid tissue around the incision. Small wound dehiscence on the ventral side no sign of active infection         Labs and Pathology:    I reviewed all applicable laboratory and pathology data and went  over findings with patient      Lab Results   Component Value Date    WBC 8.1 04/11/2022     Lab Results   Component Value Date    RBC 5.07 04/11/2022     Lab Results   Component Value Date    HGB 15.7 04/11/2022     Lab Results   Component Value Date    HCT 44.2 04/11/2022     No components found for: MCT  Lab Results   Component Value Date    MCV 87 04/11/2022     Lab Results   Component Value Date    MCH 31.0 04/11/2022     Lab Results   Component Value Date    MCHC 35.5 04/11/2022     Lab Results   Component Value Date    RDW 12.4 04/11/2022     Lab Results   Component Value Date     04/11/2022       Last Comprehensive Metabolic Panel:  Sodium   Date Value Ref Range Status   04/11/2022 141 133 - 144 mmol/L Final     Potassium   Date Value Ref Range Status   04/11/2022 4.0 3.4 - 5.3 mmol/L Final     Chloride   Date Value Ref Range Status   04/11/2022 107 94 - 109 mmol/L Final     Carbon Dioxide (CO2)   Date Value Ref Range Status   04/11/2022 30 20 - 32 mmol/L Final     Anion Gap   Date Value Ref Range Status   04/11/2022 4 3 - 14 mmol/L Final     Glucose   Date Value Ref Range Status   04/11/2022 106 (H) 70 - 99 mg/dL Final     Urea Nitrogen   Date Value Ref Range Status   04/11/2022 17 7 - 30 mg/dL Final     Creatinine   Date Value Ref Range Status   04/11/2022 1.23 0.66 - 1.25 mg/dL Final     GFR Estimate   Date Value Ref Range Status   04/11/2022 66 >60 mL/min/1.73m2 Final     Comment:     Effective December 21, 2021 eGFRcr in adults is calculated using the 2021 CKD-EPI creatinine equation which includes age and gender (Wilmer et al., NEJM, DOI: 10.1056/YYRQdb4265872)     Calcium   Date Value Ref Range Status   04/11/2022 9.2 8.5 - 10.1 mg/dL Final       INR/Prothrombin Time    Creatinine   Date Value Ref Range Status   04/11/2022 1.23 0.66 - 1.25 mg/dL Final        No results found for: PSA        Imaging:    No pertinent imaging to review today            Assessment and Plan:     Assessment     62  year old male with history of thrombosed dorsal penile vein status post penile exploration with no evidence of penile or urethral injury with postoperative surgical site infection improving with antibiotics. Small wound dehiscence      Plan  Complete the course of antibiotics   Refrain from sexual activity for few more weeks until the incision is completely healed   Return to clinic as needed    60 total minutes spent on the date of the encounter including direct interaction with the patient, performing chart review, documentation and further activities as noted above        Kyaw Cid MD   Department of Urology   [facility]         Again, thank you for allowing me to participate in the care of your patient.      Sincerely,    Kyaw Cid MD

## 2022-04-27 NOTE — LETTER
Date:April 28, 2022      Provider requested that no letter be sent. Do not send.       [facility]

## 2022-04-27 NOTE — NURSING NOTE
"Chief Complaint   Patient presents with     Follow Up     Post-op       Blood pressure (!) 157/84, pulse 86, height 1.854 m (6' 1\"), weight 127 kg (280 lb). Body mass index is 36.94 kg/m .    Patient Active Problem List   Diagnosis     Closed nondisplaced fracture of medial malleolus of left tibia, initial encounter     Achilles tendinitis of left lower extremity     Penile fracture       Allergies   Allergen Reactions     Codeine Hives, Nausea and Itching       Current Outpatient Medications   Medication Sig Dispense Refill     cephALEXin (KEFLEX) 500 MG capsule Take 1 capsule (500 mg) by mouth 4 times daily for 5 days 20 capsule 0     cyclobenzaprine (FLEXERIL) 10 MG tablet Take 1 tablet (10 mg) by mouth 3 times daily as needed for muscle spasms 15 tablet 0     ibuprofen (ADVIL/MOTRIN) 200 MG tablet Take 400 mg by mouth every 4 hours as needed for mild pain       oxyCODONE (ROXICODONE) 5 MG tablet Take 1-2 tablets (5-10 mg) by mouth every 6 hours as needed for severe pain 12 tablet 0       Social History     Tobacco Use     Smoking status: Never Smoker     Smokeless tobacco: Never Used       Karishma Robert  4/27/2022  12:01 PM  "

## (undated) DEVICE — SPECIMEN CONTAINER 5OZ STERILE 2600SA

## (undated) DEVICE — SOL WATER IRRIG 1000ML BOTTLE 2F7114

## (undated) DEVICE — COVER MAYO STAND STERILE-Z 5582

## (undated) DEVICE — LINEN ORTHO PACK 5446

## (undated) DEVICE — LABEL MEDICATION SYSTEM 3303-P

## (undated) DEVICE — PEN MARKING SKIN W/LABELS 31145918

## (undated) DEVICE — GOWN IMPERVIOUS SPECIALTY XLG/XLONG 32474

## (undated) DEVICE — SOL NACL 0.9% IRRIG 1000ML BOTTLE 2F7124

## (undated) DEVICE — BASIN SET MAJOR

## (undated) DEVICE — DRAPE LAP W/ARMBOARD 29410

## (undated) DEVICE — SU MONOCRYL 4-0 PS-2 18" UND Y496G

## (undated) DEVICE — SU VICRYL 3-0 CT-2 27" J332H

## (undated) DEVICE — Device

## (undated) DEVICE — DECANTER TRANSFER DEVICE 2008S

## (undated) DEVICE — SU VICRYL 2-0 SH 27" J317H

## (undated) DEVICE — LINEN GOWN X4 5410

## (undated) DEVICE — SYR 30ML LL W/O NDL 302832

## (undated) DEVICE — CATH TRAY FOLEY SURESTEP 16FR WDRAIN BAG STLK LATEX A300316A

## (undated) DEVICE — LINEN TOWEL PACK X5 5464

## (undated) DEVICE — COVER BACK TABLE STERILE-Z 5575

## (undated) DEVICE — BNDG ELASTIC 3"X5YDS UNSTERILE 6611-30

## (undated) DEVICE — SUCTION TIP YANKAUER W/O VENT K86

## (undated) DEVICE — ESU GROUND PAD UNIVERSAL W/O CORD

## (undated) DEVICE — PREP CHLORAPREP 26ML TINTED HI-LITE ORANGE 930815

## (undated) DEVICE — NDL ANGIOCATH 14GA 1.25" 4048

## (undated) DEVICE — ENDO SEAL BX PORT BPS-A

## (undated) DEVICE — SYR 10ML FINGER CONTROL W/O NDL 309695

## (undated) DEVICE — LIGHT HANDLE X2

## (undated) DEVICE — ESU ELEC NDL 1" E1552

## (undated) DEVICE — VESSEL LOOPS RED MINI 31145710

## (undated) DEVICE — PAD CHUX UNDERPAD 30X36" P3036C

## (undated) DEVICE — GLOVE PROTEXIS W/NEU-THERA 7.5  2D73TE75

## (undated) DEVICE — SYR EAR BULB 3OZ 0035830

## (undated) DEVICE — SUPPORTER ATHLETIC LG LATEX 202636

## (undated) DEVICE — TUBING SUCTION MEDI-VAC SOFT 3/16"X20' N520A

## (undated) DEVICE — STRAP KNEE/BODY 31143004

## (undated) DEVICE — DRAIN PENROSE 0.25"X18" LATEX FREE GR201

## (undated) DEVICE — GOWN XLG DISP 9545

## (undated) RX ORDER — BUPIVACAINE HYDROCHLORIDE 2.5 MG/ML
INJECTION, SOLUTION EPIDURAL; INFILTRATION; INTRACAUDAL
Status: DISPENSED
Start: 2022-04-12

## (undated) RX ORDER — BUPIVACAINE HYDROCHLORIDE 5 MG/ML
INJECTION, SOLUTION EPIDURAL; INTRACAUDAL
Status: DISPENSED
Start: 2022-04-12

## (undated) RX ORDER — FENTANYL CITRATE 50 UG/ML
INJECTION, SOLUTION INTRAMUSCULAR; INTRAVENOUS
Status: DISPENSED
Start: 2022-04-11

## (undated) RX ORDER — LIDOCAINE HYDROCHLORIDE 20 MG/ML
INJECTION, SOLUTION EPIDURAL; INFILTRATION; INTRACAUDAL; PERINEURAL
Status: DISPENSED
Start: 2022-04-12

## (undated) RX ORDER — ROCURONIUM BROMIDE 50 MG/5 ML
SYRINGE (ML) INTRAVENOUS
Status: DISPENSED
Start: 2022-04-12

## (undated) RX ORDER — ONDANSETRON 2 MG/ML
INJECTION INTRAMUSCULAR; INTRAVENOUS
Status: DISPENSED
Start: 2022-04-12

## (undated) RX ORDER — FENTANYL CITRATE-0.9 % NACL/PF 10 MCG/ML
PLASTIC BAG, INJECTION (ML) INTRAVENOUS
Status: DISPENSED
Start: 2022-04-12